# Patient Record
Sex: MALE | Race: WHITE | HISPANIC OR LATINO | Employment: OTHER | URBAN - METROPOLITAN AREA
[De-identification: names, ages, dates, MRNs, and addresses within clinical notes are randomized per-mention and may not be internally consistent; named-entity substitution may affect disease eponyms.]

---

## 2023-06-02 ENCOUNTER — APPOINTMENT (OUTPATIENT)
Dept: CT IMAGING | Facility: CLINIC | Age: 68
DRG: 517 | End: 2023-06-02
Attending: STUDENT IN AN ORGANIZED HEALTH CARE EDUCATION/TRAINING PROGRAM

## 2023-06-02 ENCOUNTER — MEDICAL CORRESPONDENCE (OUTPATIENT)
Dept: HEALTH INFORMATION MANAGEMENT | Facility: CLINIC | Age: 68
End: 2023-06-02

## 2023-06-02 ENCOUNTER — HOSPITAL ENCOUNTER (INPATIENT)
Facility: CLINIC | Age: 68
LOS: 1 days | Discharge: HOME | DRG: 517 | End: 2023-06-04
Attending: STUDENT IN AN ORGANIZED HEALTH CARE EDUCATION/TRAINING PROGRAM | Admitting: INTERNAL MEDICINE

## 2023-06-02 ENCOUNTER — TRANSFERRED RECORDS (OUTPATIENT)
Dept: HEALTH INFORMATION MANAGEMENT | Facility: CLINIC | Age: 68
End: 2023-06-02

## 2023-06-02 DIAGNOSIS — H49.22 LEFT ABDUCENS NERVE PALSY: ICD-10-CM

## 2023-06-02 DIAGNOSIS — M31.6 GIANT CELL ARTERITIS (H): Primary | ICD-10-CM

## 2023-06-02 DIAGNOSIS — H53.8 BLURRED VISION: ICD-10-CM

## 2023-06-02 DIAGNOSIS — Z11.52 ENCOUNTER FOR SCREENING LABORATORY TESTING FOR SEVERE ACUTE RESPIRATORY SYNDROME CORONAVIRUS 2 (SARS-COV-2): ICD-10-CM

## 2023-06-02 DIAGNOSIS — G44.209 TENSION HEADACHE: ICD-10-CM

## 2023-06-02 DIAGNOSIS — E11.9 TYPE 2 DIABETES MELLITUS WITHOUT COMPLICATION, WITHOUT LONG-TERM CURRENT USE OF INSULIN (H): ICD-10-CM

## 2023-06-02 LAB
ANION GAP SERPL CALCULATED.3IONS-SCNC: 12 MMOL/L (ref 7–15)
BASOPHILS # BLD AUTO: 0 10E3/UL (ref 0–0.2)
BASOPHILS NFR BLD AUTO: 0 %
BUN SERPL-MCNC: 13.8 MG/DL (ref 8–23)
CALCIUM SERPL-MCNC: 9.1 MG/DL (ref 8.8–10.2)
CHLORIDE SERPL-SCNC: 105 MMOL/L (ref 98–107)
CREAT SERPL-MCNC: 0.71 MG/DL (ref 0.67–1.17)
CRP SERPL-MCNC: 9.97 MG/L
DEPRECATED HCO3 PLAS-SCNC: 21 MMOL/L (ref 22–29)
EOSINOPHIL # BLD AUTO: 0.2 10E3/UL (ref 0–0.7)
EOSINOPHIL NFR BLD AUTO: 3 %
ERYTHROCYTE [DISTWIDTH] IN BLOOD BY AUTOMATED COUNT: 12.6 % (ref 10–15)
ERYTHROCYTE [SEDIMENTATION RATE] IN BLOOD BY WESTERGREN METHOD: 86 MM/HR (ref 0–20)
GFR SERPL CREATININE-BSD FRML MDRD: >90 ML/MIN/1.73M2
GLUCOSE SERPL-MCNC: 272 MG/DL (ref 70–99)
HCT VFR BLD AUTO: 33.9 % (ref 40–53)
HGB BLD-MCNC: 10.7 G/DL (ref 13.3–17.7)
IMM GRANULOCYTES # BLD: 0 10E3/UL
IMM GRANULOCYTES NFR BLD: 0 %
LYMPHOCYTES # BLD AUTO: 2 10E3/UL (ref 0.8–5.3)
LYMPHOCYTES NFR BLD AUTO: 24 %
MCH RBC QN AUTO: 25.3 PG (ref 26.5–33)
MCHC RBC AUTO-ENTMCNC: 31.6 G/DL (ref 31.5–36.5)
MCV RBC AUTO: 80 FL (ref 78–100)
MONOCYTES # BLD AUTO: 0.5 10E3/UL (ref 0–1.3)
MONOCYTES NFR BLD AUTO: 6 %
NEUTROPHILS # BLD AUTO: 5.7 10E3/UL (ref 1.6–8.3)
NEUTROPHILS NFR BLD AUTO: 67 %
NRBC # BLD AUTO: 0 10E3/UL
NRBC BLD AUTO-RTO: 0 /100
PLATELET # BLD AUTO: 362 10E3/UL (ref 150–450)
POTASSIUM SERPL-SCNC: 4.2 MMOL/L (ref 3.4–5.3)
RBC # BLD AUTO: 4.23 10E6/UL (ref 4.4–5.9)
RETINOPATHY: POSITIVE
SODIUM SERPL-SCNC: 138 MMOL/L (ref 136–145)
WBC # BLD AUTO: 8.5 10E3/UL (ref 4–11)

## 2023-06-02 PROCEDURE — 009U3ZX DRAINAGE OF SPINAL CANAL, PERCUTANEOUS APPROACH, DIAGNOSTIC: ICD-10-PCS | Performed by: STUDENT IN AN ORGANIZED HEALTH CARE EDUCATION/TRAINING PROGRAM

## 2023-06-02 PROCEDURE — 36415 COLL VENOUS BLD VENIPUNCTURE: CPT | Performed by: STUDENT IN AN ORGANIZED HEALTH CARE EDUCATION/TRAINING PROGRAM

## 2023-06-02 PROCEDURE — 99231 SBSQ HOSP IP/OBS SF/LOW 25: CPT | Performed by: OPHTHALMOLOGY

## 2023-06-02 PROCEDURE — 85025 COMPLETE CBC W/AUTO DIFF WBC: CPT | Performed by: STUDENT IN AN ORGANIZED HEALTH CARE EDUCATION/TRAINING PROGRAM

## 2023-06-02 PROCEDURE — 82945 GLUCOSE OTHER FLUID: CPT | Performed by: STUDENT IN AN ORGANIZED HEALTH CARE EDUCATION/TRAINING PROGRAM

## 2023-06-02 PROCEDURE — 70450 CT HEAD/BRAIN W/O DYE: CPT | Mod: 26 | Performed by: RADIOLOGY

## 2023-06-02 PROCEDURE — 80048 BASIC METABOLIC PNL TOTAL CA: CPT | Performed by: STUDENT IN AN ORGANIZED HEALTH CARE EDUCATION/TRAINING PROGRAM

## 2023-06-02 PROCEDURE — 03BT0ZX EXCISION OF LEFT TEMPORAL ARTERY, OPEN APPROACH, DIAGNOSTIC: ICD-10-PCS | Performed by: OPHTHALMOLOGY

## 2023-06-02 PROCEDURE — 89050 BODY FLUID CELL COUNT: CPT | Performed by: STUDENT IN AN ORGANIZED HEALTH CARE EDUCATION/TRAINING PROGRAM

## 2023-06-02 PROCEDURE — 62270 DX LMBR SPI PNXR: CPT | Performed by: STUDENT IN AN ORGANIZED HEALTH CARE EDUCATION/TRAINING PROGRAM

## 2023-06-02 PROCEDURE — 84157 ASSAY OF PROTEIN OTHER: CPT | Performed by: STUDENT IN AN ORGANIZED HEALTH CARE EDUCATION/TRAINING PROGRAM

## 2023-06-02 PROCEDURE — 85652 RBC SED RATE AUTOMATED: CPT | Performed by: STUDENT IN AN ORGANIZED HEALTH CARE EDUCATION/TRAINING PROGRAM

## 2023-06-02 PROCEDURE — 62270 DX LMBR SPI PNXR: CPT

## 2023-06-02 PROCEDURE — 83036 HEMOGLOBIN GLYCOSYLATED A1C: CPT

## 2023-06-02 PROCEDURE — 87205 SMEAR GRAM STAIN: CPT | Performed by: STUDENT IN AN ORGANIZED HEALTH CARE EDUCATION/TRAINING PROGRAM

## 2023-06-02 PROCEDURE — C9803 HOPD COVID-19 SPEC COLLECT: HCPCS

## 2023-06-02 PROCEDURE — 86140 C-REACTIVE PROTEIN: CPT | Performed by: STUDENT IN AN ORGANIZED HEALTH CARE EDUCATION/TRAINING PROGRAM

## 2023-06-02 PROCEDURE — 99285 EMERGENCY DEPT VISIT HI MDM: CPT | Mod: 25

## 2023-06-02 PROCEDURE — 87015 SPECIMEN INFECT AGNT CONCNTJ: CPT | Performed by: STUDENT IN AN ORGANIZED HEALTH CARE EDUCATION/TRAINING PROGRAM

## 2023-06-02 PROCEDURE — 99285 EMERGENCY DEPT VISIT HI MDM: CPT | Mod: 25 | Performed by: STUDENT IN AN ORGANIZED HEALTH CARE EDUCATION/TRAINING PROGRAM

## 2023-06-02 PROCEDURE — 70450 CT HEAD/BRAIN W/O DYE: CPT

## 2023-06-02 RX ORDER — PROPARACAINE HYDROCHLORIDE 5 MG/ML
1 SOLUTION/ DROPS OPHTHALMIC ONCE
Status: DISCONTINUED | OUTPATIENT
Start: 2023-06-02 | End: 2023-06-04 | Stop reason: HOSPADM

## 2023-06-02 ASSESSMENT — ACTIVITIES OF DAILY LIVING (ADL)
ADLS_ACUITY_SCORE: 35

## 2023-06-02 NOTE — ED TRIAGE NOTES
Pt presents with referral from opthalmology for concerns of increased interocular pressure to the left eye.       Triage Assessment     Row Name 06/02/23 1330       Triage Assessment (Adult)    Airway WDL WDL       Respiratory WDL    Respiratory WDL WDL       Skin Circulation/Temperature WDL    Skin Circulation/Temperature WDL WDL       Cardiac WDL    Cardiac WDL WDL       Peripheral/Neurovascular WDL    Peripheral Neurovascular WDL WDL       Cognitive/Neuro/Behavioral WDL    Cognitive/Neuro/Behavioral WDL WDL

## 2023-06-02 NOTE — ED PROVIDER NOTES
[place] EMERGENCY DEPARTMENT ([facility])    6/02/23       ED PROVIDER NOTE    History     Chief Complaint   Patient presents with     Eye Problem     HPI  Zeyad Machado is a 67 year old male who has a history of T2DM presents to the ED with concerns for his his ophthalmologist in the setting of his double vision and headaches.    Patient endorses double vision especially with looking left, and does have occasional headaches, at this time has mild headache on the left side of his head, no other significant symptoms.  No nausea, vomiting.    Per chart review, the patient has been seen at [facility] today (6/02/2023) with c/o of double vision and pain starting from left eye to back of head. . PT was referred from [facility].   Patient stated that this started about 2 months ago. He relieved headaches with tylenol, had diplopia at time of onset headaches. Headaches are left-sided starts near left eye and works backwards to occipital area.  Diplopia has not improved. Patient initially had a fairly broad work-up at [facility] with concern for possible underlying stroke, and ended up with a CT as well as MRI which ruled out stroke according to his daughter.  Per the records, it was noted that he had imaging but did not have a lumbar puncture done at that time.    At his ophthalmology appointment today, they noted that he had evidence of papilledema bilaterally, and were concerned and recommended that he be evaluated here with an LP done with opening pressure.  Please page ophthalmology/neuro-ophthalmology upon arrival. Pupils were dilated at exam today.      Reason for referral: Left CN 6th palsy with left-sided possible optic nerve edema with left-sided headache, had MRI at [facility]      Past Medical History  History reviewed. No pertinent past medical history.  History reviewed. No pertinent surgical history.  metFORMIN (GLUCOPHAGE) 500 MG tablet      No Known  "Allergies  Family History  History reviewed. No pertinent family history.  Social History   Social History     Tobacco Use     Smoking status: Every Day     Types: Cigarettes     Smokeless tobacco: Never         A medically appropriate review of systems was performed with pertinent positives and negatives noted in the HPI, and all other systems negative.    Physical Exam   BP: 123/62  Pulse: 100  Temp: 98.4  F (36.9  C)  Resp: 18  Height: 165.1 cm (5' 5\")  Weight: 68.9 kg (152 lb)  SpO2: 98 %  Physical Exam  Eyes:      Extraocular Movements:      Right eye: No nystagmus.      Left eye: Abnormal extraocular motion (No abduction) present. No nystagmus.       GEN: Well appearing, non toxic, cooperative  HEENT: normocephalic and atraumatic, PERRLA;   CV: well-perfused, normal skin color for ethnicity, regular rate and rhythm  PULM: breathing comfortably, in no respiratory distress, clear to auscultation  ABD: nondistended  EXT: Full range of motion.  No edema.  Neuro:  CN II-XII intact with the exception of his left eye abduction which is absent  Awake, alert, oriented x3  Motor: Grossly intact motor function of bilateral upper and lower extremities.  Relatively preserved muscle tone bilateral upper and lower extremities  Sensory: Grossly intact sensation in bilateral upper and lower  Coordination: No truncal or limb ataxia, normal finger-to-nose, normal rapid alternatin  SKIN: No rashes, ecchymosis, or lacerations  PSYCH: Calm and cooperative, interactive      ED Course, Procedures, & Data      [facility]    -Lumbar Puncture    Date/Time: 6/3/2023 12:10 AM    Performed by: Gloria Ordoñez MD  Authorized by: Gloria Ordoñez MD    Risks, benefits and alternatives discussed.      PRE-PROCEDURE DETAILS:     Procedure purpose:  Diagnostic    ANESTHESIA (see MAR for exact dosages):     Anesthesia method:  None  PROCEDURE DETAILS:     Lumbar space:  L4-L5 interspace    Patient " position:  L lateral decubitus    Needle gauge:  20    Needle type:  Spinal needle - Quincke tip    Needle length (in):  3.5    Ultrasound guidance: no      Number of attempts:  1    Opening pressure (cm H2O):  22    Closing pressure (cm H2O):  14    Fluid appearance:  Clear    Tubes of fluid:  4    Total volume (ml):  6    POST-PROCEDURE:     Puncture site:  Adhesive bandage applied        PROCEDURE    Patient Tolerance:  Patient tolerated the procedure well with no immediate complications                 Results for orders placed or performed during the hospital encounter of 06/02/23   CT Head w/o Contrast     Status: None    Narrative    CT HEAD W/O CONTRAST 6/2/2023 4:49 PM    Provided History: CN IV palsy, papilledema    Comparison: None.    Technique: Using multidetector thin collimation helical acquisition  technique, axial, coronal and sagittal CT images from the skull base  to the vertex were obtained without intravenous contrast.     Findings:    No intracranial hemorrhage. No mass effect. No midline shift. No  extra-axial fluid collection. The gray to white matter differentiation  of the cerebral hemispheres is preserved. Ventricles are proportionate  to the sulci. No sulcal effacement. The basal cisterns are patent.    The visualized paranasal sinuses are relatively clear. The mastoid air  cells are hypoplastic, but clear. Orbits appear unremarkable. No acute  fracture.      Impression    Impression: No acute intracranial pathology.    I have personally reviewed the examination and initial interpretation  and I agree with the findings.    ENZO JEFFERSON MD         SYSTEM ID:  [id number]   Basic metabolic panel     Status: Abnormal   Result Value Ref Range    Sodium 138 136 - 145 mmol/L    Potassium 4.2 3.4 - 5.3 mmol/L    Chloride 105 98 - 107 mmol/L    Carbon Dioxide (CO2) 21 (L) 22 - 29 mmol/L    Anion Gap 12 7 - 15 mmol/L    Urea Nitrogen 13.8 8.0 - 23.0 mg/dL    Creatinine 0.71 0.67 - 1.17 mg/dL     Calcium 9.1 8.8 - 10.2 mg/dL    Glucose 272 (H) 70 - 99 mg/dL    GFR Estimate >90 >60 mL/min/1.73m2   Erythrocyte sedimentation rate auto     Status: Abnormal   Result Value Ref Range    Erythrocyte Sedimentation Rate 86 (H) 0 - 20 mm/hr   CRP inflammation     Status: Abnormal   Result Value Ref Range    CRP Inflammation 9.97 (H) <5.00 mg/L   CBC with platelets and differential     Status: Abnormal   Result Value Ref Range    WBC Count 8.5 4.0 - 11.0 10e3/uL    RBC Count 4.23 (L) 4.40 - 5.90 10e6/uL    Hemoglobin 10.7 (L) 13.3 - 17.7 g/dL    Hematocrit 33.9 (L) 40.0 - 53.0 %    MCV 80 78 - 100 fL    MCH 25.3 (L) 26.5 - 33.0 pg    MCHC 31.6 31.5 - 36.5 g/dL    RDW 12.6 10.0 - 15.0 %    Platelet Count 362 150 - 450 10e3/uL    % Neutrophils 67 %    % Lymphocytes 24 %    % Monocytes 6 %    % Eosinophils 3 %    % Basophils 0 %    % Immature Granulocytes 0 %    NRBCs per 100 WBC 0 <1 /100    Absolute Neutrophils 5.7 1.6 - 8.3 10e3/uL    Absolute Lymphocytes 2.0 0.8 - 5.3 10e3/uL    Absolute Monocytes 0.5 0.0 - 1.3 10e3/uL    Absolute Eosinophils 0.2 0.0 - 0.7 10e3/uL    Absolute Basophils 0.0 0.0 - 0.2 10e3/uL    Absolute Immature Granulocytes 0.0 <=0.4 10e3/uL    Absolute NRBCs 0.0 10e3/uL   CBC with platelets differential     Status: Abnormal    Narrative    The following orders were created for panel order CBC with platelets differential.  Procedure                               Abnormality         Status                     ---------                               -----------         ------                     CBC with platelets and d...[[id number]]  Abnormal            Final result                 Please view results for these tests on the individual orders.   CSF Cell Count with Differential:     Status: None (In process)    Narrative    The following orders were created for panel order CSF Cell Count with Differential:.  Procedure                               Abnormality         Status                     ---------                                -----------         ------                     Cell Count CSF[[id number]]                                   In process                   Please view results for these tests on the individual orders.     Medications   proparacaine (ALCAINE) 0.5 % ophthalmic solution 1 drop (has no administration in time range)     Labs Ordered and Resulted from Time of ED Arrival to Time of ED Departure   BASIC METABOLIC PANEL - Abnormal       Result Value    Sodium 138      Potassium 4.2      Chloride 105      Carbon Dioxide (CO2) 21 (*)     Anion Gap 12      Urea Nitrogen 13.8      Creatinine 0.71      Calcium 9.1      Glucose 272 (*)     GFR Estimate >90     ERYTHROCYTE SEDIMENTATION RATE AUTO - Abnormal    Erythrocyte Sedimentation Rate 86 (*)    CRP INFLAMMATION - Abnormal    CRP Inflammation 9.97 (*)    CBC WITH PLATELETS AND DIFFERENTIAL - Abnormal    WBC Count 8.5      RBC Count 4.23 (*)     Hemoglobin 10.7 (*)     Hematocrit 33.9 (*)     MCV 80      MCH 25.3 (*)     MCHC 31.6      RDW 12.6      Platelet Count 362      % Neutrophils 67      % Lymphocytes 24      % Monocytes 6      % Eosinophils 3      % Basophils 0      % Immature Granulocytes 0      NRBCs per 100 WBC 0      Absolute Neutrophils 5.7      Absolute Lymphocytes 2.0      Absolute Monocytes 0.5      Absolute Eosinophils 0.2      Absolute Basophils 0.0      Absolute Immature Granulocytes 0.0      Absolute NRBCs 0.0     GLUCOSE CSF   PROTEIN TOTAL CSF   CELL COUNT CSF   AEROBIC BACTERIAL CULTURE ROUTINE   CELL COUNT WITH DIFFERENTIAL CSF     CT Head w/o Contrast   Final Result   Impression: No acute intracranial pathology.      I have personally reviewed the examination and initial interpretation   and I agree with the findings.      ENZO JEFFERSON MD            SYSTEM ID:  [id number]             Critical care was not performed.     Medical Decision Making  The patient's presentation was of moderate complexity (an undiagnosed new problem  with uncertain diagnosis).    The patient's evaluation involved:  review of external note(s) from 3+ sources (see separate area of note for details)  ordering and/or review of 3+ test(s) in this encounter (see separate area of note for details)  review of 3+ test result(s) ordered prior to this encounter (see separate area of note for details)    The patient's management necessitated high risk (a decision regarding hospitalization).      Assessment & Plan    67-year-old male with a history of diabetic retinopathy presenting to the emergency department in setting of approximately 3 months of double vision with left eye cranial nerve VI palsy noted, with previous CT, and MRI that was within normal limits according to family, with no records available at this time presenting from the ophthalmologist due to concern for papilledema noted bilaterally in the setting of his cranial 6th nerve palsy, with request for evaluation by neuro ophthalmology here, as well as further work-up including lab work and likely lumbar puncture to evaluate for opening pressure here.    Difficulty due to the fact that his records do not appear to be communicating with this chart.  Unable to find any further work-up that he had done previously, even though [facility] and should be in our system.  However, according to family the MRI he had done was not abnormal with no evidence of any lesions or tumors.  Less consistent with normal pressure hydrocephalus, however daily headaches especially worse in the morning and some visual changes could possibly represent this.    We will plan for repeat head CT due to due to neurodeficits, lab work here, and neuro-ophthalmology evaluation    12:14 AM LP performed.  No difficulty at the time, and clear CSF noted.  No significant elevated opening pressures.  Inflammatory markers are elevated and ophthalmology evaluated the patient with concern for possible edema of the optic disc.  In light of  elevated ESR and CRP, concern for possible temporal/giant cell arteritis.  We will plan for IV methylprednisone, and admission to medicine.  Neurology is aware and will follow    I have reviewed the nursing notes. I have reviewed the findings, diagnosis, plan and need for follow up with the patient.    New Prescriptions    No medications on file       Final diagnoses:   Tension headache   Blurred vision   Left abducens nerve palsy       Gloria Ordoñez MD  [facility] EMERGENCY DEPARTMENT  6/2/2023     Gloria Ordoñez MD  06/03/23 0015

## 2023-06-03 ENCOUNTER — APPOINTMENT (OUTPATIENT)
Dept: GENERAL RADIOLOGY | Facility: CLINIC | Age: 68
DRG: 517 | End: 2023-06-03
Attending: STUDENT IN AN ORGANIZED HEALTH CARE EDUCATION/TRAINING PROGRAM

## 2023-06-03 PROBLEM — M31.6 GIANT CELL ARTERITIS (H): Status: ACTIVE | Noted: 2023-06-03

## 2023-06-03 LAB
ALBUMIN MFR UR ELPH: 59.6 MG/DL (ref 1–14)
ALBUMIN SERPL BCG-MCNC: 3.8 G/DL (ref 3.5–5.2)
ALBUMIN UR-MCNC: 50 MG/DL
ALP SERPL-CCNC: 106 U/L (ref 40–129)
ALT SERPL W P-5'-P-CCNC: 21 U/L (ref 10–50)
ANION GAP SERPL CALCULATED.3IONS-SCNC: 14 MMOL/L (ref 7–15)
APPEARANCE CSF: CLEAR
APPEARANCE UR: CLEAR
AST SERPL W P-5'-P-CCNC: 20 U/L (ref 10–50)
BILIRUB SERPL-MCNC: 0.2 MG/DL
BILIRUB UR QL STRIP: NEGATIVE
BUN SERPL-MCNC: 14.1 MG/DL (ref 8–23)
CALCIUM SERPL-MCNC: 9.2 MG/DL (ref 8.8–10.2)
CHLORIDE SERPL-SCNC: 105 MMOL/L (ref 98–107)
CHOLEST SERPL-MCNC: 133 MG/DL
COLOR CSF: COLORLESS
COLOR UR AUTO: ABNORMAL
CREAT SERPL-MCNC: 0.76 MG/DL (ref 0.67–1.17)
CREAT UR-MCNC: 49.5 MG/DL
CRP SERPL-MCNC: 7.19 MG/L
DEPRECATED HCO3 PLAS-SCNC: 19 MMOL/L (ref 22–29)
ERYTHROCYTE [SEDIMENTATION RATE] IN BLOOD BY WESTERGREN METHOD: 78 MM/HR (ref 0–20)
GFR SERPL CREATININE-BSD FRML MDRD: >90 ML/MIN/1.73M2
GLUCOSE BLDC GLUCOMTR-MCNC: 279 MG/DL (ref 70–99)
GLUCOSE BLDC GLUCOMTR-MCNC: 281 MG/DL (ref 70–99)
GLUCOSE BLDC GLUCOMTR-MCNC: 294 MG/DL (ref 70–99)
GLUCOSE BLDC GLUCOMTR-MCNC: 406 MG/DL (ref 70–99)
GLUCOSE CSF-MCNC: 116 MG/DL (ref 40–70)
GLUCOSE SERPL-MCNC: 301 MG/DL (ref 70–99)
GLUCOSE UR STRIP-MCNC: >=1000 MG/DL
HBA1C MFR BLD: 8.1 %
HDLC SERPL-MCNC: 31 MG/DL
HGB UR QL STRIP: NEGATIVE
HOLD SPECIMEN: NORMAL
HOLD SPECIMEN: NORMAL
KETONES UR STRIP-MCNC: 20 MG/DL
LDLC SERPL CALC-MCNC: 84 MG/DL
LEUKOCYTE ESTERASE UR QL STRIP: NEGATIVE
NITRATE UR QL: NEGATIVE
NONHDLC SERPL-MCNC: 102 MG/DL
PH UR STRIP: 6 [PH] (ref 5–7)
POTASSIUM SERPL-SCNC: 4.1 MMOL/L (ref 3.4–5.3)
PROT CSF-MCNC: 61.9 MG/DL (ref 15–45)
PROT SERPL-MCNC: 7.2 G/DL (ref 6.4–8.3)
PROT/CREAT 24H UR: 1.2 MG/MG CR (ref 0–0.2)
RBC # CSF MANUAL: 0 /UL (ref 0–2)
RBC URINE: 2 /HPF
SARS-COV-2 RNA RESP QL NAA+PROBE: NEGATIVE
SODIUM SERPL-SCNC: 138 MMOL/L (ref 136–145)
SP GR UR STRIP: 1.02 (ref 1–1.03)
TRANSITIONAL EPI: <1 /HPF
TRIGL SERPL-MCNC: 92 MG/DL
TUBE # CSF: 4
UROBILINOGEN UR STRIP-MCNC: NORMAL MG/DL
WBC # CSF MANUAL: 0 /UL (ref 0–5)
WBC URINE: 1 /HPF

## 2023-06-03 PROCEDURE — 250N000012 HC RX MED GY IP 250 OP 636 PS 637

## 2023-06-03 PROCEDURE — 250N000009 HC RX 250: Performed by: STUDENT IN AN ORGANIZED HEALTH CARE EDUCATION/TRAINING PROGRAM

## 2023-06-03 PROCEDURE — 99222 1ST HOSP IP/OBS MODERATE 55: CPT | Mod: GC | Performed by: PSYCHIATRY & NEUROLOGY

## 2023-06-03 PROCEDURE — 86481 TB AG RESPONSE T-CELL SUSP: CPT | Performed by: STUDENT IN AN ORGANIZED HEALTH CARE EDUCATION/TRAINING PROGRAM

## 2023-06-03 PROCEDURE — 36415 COLL VENOUS BLD VENIPUNCTURE: CPT

## 2023-06-03 PROCEDURE — 87476 LYME DIS DNA AMP PROBE: CPT | Performed by: STUDENT IN AN ORGANIZED HEALTH CARE EDUCATION/TRAINING PROGRAM

## 2023-06-03 PROCEDURE — 87635 SARS-COV-2 COVID-19 AMP PRB: CPT

## 2023-06-03 PROCEDURE — 82962 GLUCOSE BLOOD TEST: CPT

## 2023-06-03 PROCEDURE — 86037 ANCA TITER EACH ANTIBODY: CPT | Performed by: STUDENT IN AN ORGANIZED HEALTH CARE EDUCATION/TRAINING PROGRAM

## 2023-06-03 PROCEDURE — 80053 COMPREHEN METABOLIC PANEL: CPT

## 2023-06-03 PROCEDURE — 80061 LIPID PANEL: CPT

## 2023-06-03 PROCEDURE — 86140 C-REACTIVE PROTEIN: CPT

## 2023-06-03 PROCEDURE — 86036 ANCA SCREEN EACH ANTIBODY: CPT | Performed by: STUDENT IN AN ORGANIZED HEALTH CARE EDUCATION/TRAINING PROGRAM

## 2023-06-03 PROCEDURE — 71045 X-RAY EXAM CHEST 1 VIEW: CPT

## 2023-06-03 PROCEDURE — 81001 URINALYSIS AUTO W/SCOPE: CPT

## 2023-06-03 PROCEDURE — 85652 RBC SED RATE AUTOMATED: CPT

## 2023-06-03 PROCEDURE — 99222 1ST HOSP IP/OBS MODERATE 55: CPT | Mod: GC | Performed by: INTERNAL MEDICINE

## 2023-06-03 PROCEDURE — 250N000011 HC RX IP 250 OP 636: Performed by: STUDENT IN AN ORGANIZED HEALTH CARE EDUCATION/TRAINING PROGRAM

## 2023-06-03 PROCEDURE — 250N000013 HC RX MED GY IP 250 OP 250 PS 637

## 2023-06-03 PROCEDURE — 88305 TISSUE EXAM BY PATHOLOGIST: CPT | Mod: 26 | Performed by: STUDENT IN AN ORGANIZED HEALTH CARE EDUCATION/TRAINING PROGRAM

## 2023-06-03 PROCEDURE — 86780 TREPONEMA PALLIDUM: CPT | Performed by: STUDENT IN AN ORGANIZED HEALTH CARE EDUCATION/TRAINING PROGRAM

## 2023-06-03 PROCEDURE — 120N000002 HC R&B MED SURG/OB UMMC

## 2023-06-03 PROCEDURE — 258N000003 HC RX IP 258 OP 636: Performed by: STUDENT IN AN ORGANIZED HEALTH CARE EDUCATION/TRAINING PROGRAM

## 2023-06-03 PROCEDURE — 88305 TISSUE EXAM BY PATHOLOGIST: CPT | Mod: TC | Performed by: STUDENT IN AN ORGANIZED HEALTH CARE EDUCATION/TRAINING PROGRAM

## 2023-06-03 PROCEDURE — 71045 X-RAY EXAM CHEST 1 VIEW: CPT | Mod: 26 | Performed by: RADIOLOGY

## 2023-06-03 PROCEDURE — 36415 COLL VENOUS BLD VENIPUNCTURE: CPT | Performed by: STUDENT IN AN ORGANIZED HEALTH CARE EDUCATION/TRAINING PROGRAM

## 2023-06-03 PROCEDURE — 88313 SPECIAL STAINS GROUP 2: CPT | Mod: 26 | Performed by: STUDENT IN AN ORGANIZED HEALTH CARE EDUCATION/TRAINING PROGRAM

## 2023-06-03 PROCEDURE — 82164 ANGIOTENSIN I ENZYME TEST: CPT | Performed by: STUDENT IN AN ORGANIZED HEALTH CARE EDUCATION/TRAINING PROGRAM

## 2023-06-03 PROCEDURE — 84156 ASSAY OF PROTEIN URINE: CPT

## 2023-06-03 RX ORDER — NICOTINE POLACRILEX 4 MG
15-30 LOZENGE BUCCAL
Status: DISCONTINUED | OUTPATIENT
Start: 2023-06-03 | End: 2023-06-04 | Stop reason: HOSPADM

## 2023-06-03 RX ORDER — LOSARTAN POTASSIUM 25 MG/1
25 TABLET ORAL DAILY
Status: DISCONTINUED | OUTPATIENT
Start: 2023-06-03 | End: 2023-06-04 | Stop reason: HOSPADM

## 2023-06-03 RX ORDER — ATORVASTATIN CALCIUM 40 MG/1
40 TABLET, FILM COATED ORAL AT BEDTIME
Status: DISCONTINUED | OUTPATIENT
Start: 2023-06-03 | End: 2023-06-04 | Stop reason: HOSPADM

## 2023-06-03 RX ORDER — METHYLPREDNISOLONE SODIUM SUCCINATE 125 MG/2ML
1000 INJECTION, POWDER, LYOPHILIZED, FOR SOLUTION INTRAMUSCULAR; INTRAVENOUS DAILY
Status: DISCONTINUED | OUTPATIENT
Start: 2023-06-03 | End: 2023-06-03

## 2023-06-03 RX ORDER — ASPIRIN 81 MG/1
81 TABLET, CHEWABLE ORAL DAILY
Status: DISCONTINUED | OUTPATIENT
Start: 2023-06-03 | End: 2023-06-04 | Stop reason: HOSPADM

## 2023-06-03 RX ORDER — DEXTROSE MONOHYDRATE 25 G/50ML
25-50 INJECTION, SOLUTION INTRAVENOUS
Status: DISCONTINUED | OUTPATIENT
Start: 2023-06-03 | End: 2023-06-04 | Stop reason: HOSPADM

## 2023-06-03 RX ORDER — SENNOSIDES 8.6 MG/1
TABLET, COATED ORAL
COMMUNITY
Start: 2023-05-19

## 2023-06-03 RX ORDER — LIDOCAINE HYDROCHLORIDE AND EPINEPHRINE 10; 10 MG/ML; UG/ML
5 INJECTION, SOLUTION INFILTRATION; PERINEURAL ONCE
Status: COMPLETED | OUTPATIENT
Start: 2023-06-03 | End: 2023-06-03

## 2023-06-03 RX ORDER — ACETAMINOPHEN 325 MG/1
650 TABLET ORAL EVERY 4 HOURS PRN
Status: DISCONTINUED | OUTPATIENT
Start: 2023-06-03 | End: 2023-06-04 | Stop reason: HOSPADM

## 2023-06-03 RX ORDER — ATORVASTATIN CALCIUM 40 MG/1
TABLET, FILM COATED ORAL
COMMUNITY
Start: 2023-02-20

## 2023-06-03 RX ORDER — METFORMIN HCL 500 MG
TABLET, EXTENDED RELEASE 24 HR ORAL
Status: ON HOLD | COMMUNITY
Start: 2023-02-20 | End: 2023-06-04

## 2023-06-03 RX ORDER — LIDOCAINE 40 MG/G
CREAM TOPICAL
Status: DISCONTINUED | OUTPATIENT
Start: 2023-06-03 | End: 2023-06-04 | Stop reason: HOSPADM

## 2023-06-03 RX ORDER — LOSARTAN POTASSIUM 25 MG/1
TABLET ORAL
COMMUNITY
Start: 2023-02-20

## 2023-06-03 RX ADMIN — METFORMIN HYDROCHLORIDE 1000 MG: 500 TABLET, FILM COATED ORAL at 17:37

## 2023-06-03 RX ADMIN — LIDOCAINE HYDROCHLORIDE,EPINEPHRINE BITARTRATE 5 ML: 10; .01 INJECTION, SOLUTION INFILTRATION; PERINEURAL at 11:17

## 2023-06-03 RX ADMIN — ASPIRIN 81 MG CHEWABLE TABLET 81 MG: 81 TABLET CHEWABLE at 12:42

## 2023-06-03 RX ADMIN — INSULIN ASPART 3 UNITS: 100 INJECTION, SOLUTION INTRAVENOUS; SUBCUTANEOUS at 09:09

## 2023-06-03 RX ADMIN — ATORVASTATIN CALCIUM 40 MG: 40 TABLET, FILM COATED ORAL at 22:14

## 2023-06-03 RX ADMIN — SODIUM CHLORIDE 1000 MG: 9 INJECTION, SOLUTION INTRAVENOUS at 02:15

## 2023-06-03 RX ADMIN — LOSARTAN POTASSIUM 25 MG: 25 TABLET, FILM COATED ORAL at 12:42

## 2023-06-03 RX ADMIN — INSULIN ASPART 6 UNITS: 100 INJECTION, SOLUTION INTRAVENOUS; SUBCUTANEOUS at 18:14

## 2023-06-03 RX ADMIN — ACETAMINOPHEN 650 MG: 325 TABLET ORAL at 20:56

## 2023-06-03 RX ADMIN — INSULIN ASPART 3 UNITS: 100 INJECTION, SOLUTION INTRAVENOUS; SUBCUTANEOUS at 12:53

## 2023-06-03 ASSESSMENT — ACTIVITIES OF DAILY LIVING (ADL)
ADLS_ACUITY_SCORE: 35
ADLS_ACUITY_SCORE: 36
ADLS_ACUITY_SCORE: 35

## 2023-06-03 ASSESSMENT — VISUAL ACUITY: OU: DOUBLE VISION/DIPLOPIA

## 2023-06-03 NOTE — MEDICATION SCRIBE - ADMISSION MEDICATION HISTORY
Medication Scribe Admission Medication History    Admission medication history is complete. The information provided in this note is only as accurate as the sources available at the time of the update.    Medication reconciliation/reorder completed by provider prior to medication history? No    Information Source(s): Patient via in-person    Pertinent Information: None    Changes made to PTA medication list:    Added: None    Deleted: None    Changed: None    Medication Affordability:   No    Allergies reviewed with patient and updates made in EHR: no    Medication History Completed By: RT Gopal 6/3/2023 9:05 AM    Prior to Admission medications    Medication Sig Last Dose Taking? Auth Provider Long Term End Date   atorvastatin (LIPITOR) 40 MG tablet TOME CÉSAR TABLETA POR LA BOCA CADA NOCHE Past Week Yes Reported, Patient Yes    losartan (COZAAR) 25 MG tablet TOME CÉSAR TABLETA POR LA BOCA CADA NAREN Past Week Yes Reported, Patient Yes    metFORMIN (GLUCOPHAGE XR) 500 MG 24 hr tablet TOME CUATRO TABLETAS POR LA BOCA CADA NAREN CON COMIDA Past Week Yes Reported, Patient Yes    SENNA-TIME 8.6 MG tablet TAKE ONE TABLET BY MOUTH AS DIRECTED 1-2 veces EVERY DAY AS NEEDED para estrenimiento  at prn Yes Reported, Patient

## 2023-06-03 NOTE — DISCHARGE INSTRUCTIONS
Temporal Artery Biopsy Home Care Instructions    Bleeding or bruising: Bruising is common. Putting ice on the area for 15 minutes  at a time several times today can help reduce bruising. Pink or reddish fluid leaking  from the incision is common. If the incision should bleed, put direct pressure on it  for 10 minutes. If it is still bleeding, please call the office.    Bandage: If you have a bandage on the skin, this can be removed the morning after the procedure.     Ointment: Please apply bacitracin ointment to the wound twice a day for 3-5 days.     Activity: You may shower the morning after the biopsy. No heavy lifting or bending  below your waist on the day of the biopsy. No swimming or full immersion of your  head for a week.    Discomfort: You may take Tylenol (acetaminophen) for pain.    Stitches: Be gentle with the wound while stitches are in place. These will dissolve on their own over 2 weeks and do not need to be removed.    Results: Results may take up to 2 business days. Someone from the department will  call you and the doctor who ordered the biopsy. If you have not heard your results  within 2 days, please call the office.    Call your doctor if:  You have bleeding lasting more than 10 minutes.  You have pain that cannot be controlled.  You have signs of infection (fever, redness, swelling, drainage, or other signs)  You have not received your results within 2 days.      Please call if you have questions or concerns about your biopsy.  The office number is [phone] or [phone].

## 2023-06-03 NOTE — PROGRESS NOTES
Vascular Staff    Please note Vascular Surgery no longer able to provide temporal artery biopsy service. Please consult Ophthalmology.    Danay Thomas MD

## 2023-06-03 NOTE — H&P
"    [facility]    History and Physical - Medicine Service, [facility] TEAM        Date of Admission:  6/2/2023    Assessment & Plan      Zeyad Machado is a 67 year old male admitted on 6/2/2023. He has a history of diabetes and is admitted for double vision and headache. There was intially concern for Papilledema but that was not re demonstrated on our [facility] teams exam. In addition LP did not show elevated ICP. The combination of headache and left temporal tenderness, diplopia, and elevated CRP are concerning for Giant Cell Arteritis.  Appreciate optho and neuro recs. Low concern for infection at this time.     C/F [facility]  Optho, neuro and Vascular consult  Plan for Temporal Artery Biopsy  1g IVMP for 3 days   Follow up on LP cultures  Follow Up on A1C and UA per Neuro  Trend CRP    MRA at [facility] showed flow gap in M2 segment of right MCA, discuss with  Neurology        Diet: NPO per Anesthesia Guidelines for Procedure/Surgery Except for: Meds    DVT Prophylaxis: Pneumatic Compression Devices  Aldridge Catheter: Not present  Fluids: None  Lines: None     Cardiac Monitoring: None  Code Status: Full Code      Clinically Significant Risk Factors Present on Admission                       # Overweight: Estimated body mass index is 25.29 kg/m  as calculated from the following:    Height as of this encounter: 1.651 m (5' 5\").    Weight as of this encounter: 68.9 kg (152 lb).            Disposition Plan      Expected Discharge Date: 06/07/2023                The patient's care was discussed with the Attending Physician, Dr. Antony.      Kamlesh Killian MD  Medicine Service, [facility] TEAM   [facility]  Securely message with [employer] (more info)  Text page via [employer] Paging/Directory   See signed in provider for up to date coverage information  ______________________________________________________________________    Chief Complaint   Double Vision "     History of Present Illness   Zeyad Machado is a 67 year old male who presented to the ER from the optometry clinic after three months of blurry vision and left sided headache. He was seen in the [facility] ER for this in 4/27 where a CTA and MRI were performed and reportedly unremarkable. His symptoms have persisted and he was seen at the [facility] optho clinic on 6/2 where there was concern for papilaedema and he came to the ER.     In the ER optho was consulted and their exam did not show disc edema but they were concerned for giant cell arteritis. Neurology was also consulted and LP was performed with only mildly elevated opening pressure.    He was started on Methylprednisolone and admitted to medicine.     He denies all symptoms other than those above.       Past Medical History    History reviewed. No pertinent past medical history. T2DM on metformin    Past Surgical History   History reviewed. No pertinent surgical history.    Prior to Admission Medications   Prior to Admission Medications   Prescriptions Last Dose Informant Patient Reported? Taking?   metFORMIN (GLUCOPHAGE) 500 MG tablet   Yes Yes   Sig: Take 500 mg by mouth 2 times daily (with meals)      Facility-Administered Medications: None           Physical Exam   Vital Signs: Temp: 98.4  F (36.9  C) Temp src: Oral BP: (!) 142/68 Pulse: 86   Resp: 18 SpO2: 98 % O2 Device: None (Room air)    Weight: 152 lbs 0 oz    Physical Exam  Constitutional:       Appearance: Normal appearance.   HENT:      Head: Normocephalic and atraumatic.      Comments: Tenderness of left temple     Nose: Nose normal.   Eyes:      Comments: dysconjugate gaze, L eye unable to abduct, has both horizontal and vertical diplopia when he opens both eyes - disappears if he only opens one eye.    Cardiovascular:      Rate and Rhythm: Normal rate and regular rhythm.      Heart sounds: Normal heart sounds.   Abdominal:      Palpations: Abdomen is soft.   Skin:     General: Skin is warm.    Neurological:      Mental Status: He is alert.      Cranial Nerves: Cranial nerve deficit present.      Motor: No weakness.      Comments:  face symmetric, no dysarthria, diplopia as above   Psychiatric:         Mood and Affect: Mood normal.         Behavior: Behavior normal.         Thought Content: Thought content normal.         Judgment: Judgment normal.           Medical Decision Making             Data   CRP 9.97     LP with Elevated PTN

## 2023-06-03 NOTE — PROCEDURES
OPHTHALMOLOGY PROCEDURE NOTE - TEMPORAL ARTERY BIOPSY  Lala 3, 2023  Risk/Benefits/Alternatives/Complications: Discussed with patient via   Procedure:  Left temporal artery biopsy  Rationale: Headache, temporal tenderness, L CN 6 palsy, elevated ESR/CRP  Surgeon:  Teofilo Dial MD PhD  Assistant(s): Lashell Glover MD  Anesthesia:   Local  Complications: None  Disposition: Procedure competed in ED with patient remaining in stable condition                        Procedure Note:  After informed consent was obtained, the left superficial temporal artery was identified by palpation and the skin was marked. 4 cc of 1% Lidocaine with epinephrine (1:100,000) was given. The patient was prepped and draped in a sterile fashion. A #15 blade was used to incise the skin and subcutaneous fat. Blunt dissection was then used to identify and isolate the temporal artery from the surrounding fascia. The two ends of the temporal artery and one branch were tied off with 4-0 silk. The artery was then cut leaving a 1.0 mm stump at all ligatures. The specimen was placed in formalin. The remaining bed was observed for any bleeding and none was found. Three interrupted 6-0 Vicryl sutures were placed for deep closure and a running 6-0 Vicryl suture was used to close the skin. The wound was cleaned with sterile water and bacitracin ointment was spread on the wound. Two eye pads were taped in place over the site. Aftercare was discussed with patient and family. Discharge instructions updated in the chart. The specimen was delivered to pathology by Dr. Glover and accepted for processing.    Teofilo Dial MD PhD  Fellow, Neuro-Ophthalmology   Instructor, Comprehensive Ophthalmology

## 2023-06-03 NOTE — CONSULTS
OPHTHALMOLOGY CONSULT NOTE  06/02/23    Patient: Zeyad Machado      ASSESSMENT/PLAN:     Zeyad Machado is a 67 year old male who presented with     # Binocular double vision   # CN6 Palsy left eye   # Elevated inflammatory markers  - This is a 67 year old male who is admitted to the ED after referral from an outside ophthalmologist with concern for disc edema in the setting of two months of binocular double vision.   - VA 20/30 OU   - IOP wnl  - No disc edema appreciated on dilated fundus exam  - Vascular risk factors: HTN, diabetes  - GCA ROS    - positive - temporal tenderness, difficulty ambulating from seated position   - negative - no jaw claudication, myalgias, unintentional weight loss, or fever.    - palpation of the temporal artery reveals tenderness, but no  nodularity or prominence.   - ESR/CRP - both elevated  - MRI reads from April show no intracranial pathology that would explain his symptoms. Currently awaiting the imaging.   - This patient has clear vascular risk factors that may explain the development of his CN6 palsy. However, his inflammatory markers are elevated, and he does have symptoms concerning for GCA. This includes temporal tenderness and difficulty standing from a seated position.     Plan:  - Consult neurology  - Recommend 1g IVMP for 3 days   - Consult vascular surgery for TAB   - Labs: DALLAS, ANCA, Lyme, TB, Syphilis, Sarcoid (CXR) - ordered for you  - Ophthalmology will continue to follow     It is our pleasure to participate in this patient's care and treatment. Please contact us with any further questions or concerns.    Discussed and seen with Dr. Glover who agreed with this assessment and plan.     Ian Jj MD     HISTORY OF PRESENTING ILLNESS:     Zeyad Machado is a 67 year old male who presented on 6/2/23 with complaints of double vision. Three months ago the patient began to experience left sided headache and double vision.  He went to [facility] where he was  apparently evaluated with  MRI and CT which were clear. We do not have any records of this encounter. The patient was seen by [facility] who noted ?disc edema and sent to the [facility] for evaluation.     Today the patient continues to endorse double vision worse in left gaze that has not changed in quality since onset. No flashes or floaters. No pain, redness, tearing, or itching.     He feels as though his headaches have improved, and are well controlled with tylenol.      10+ review of systems were otherwise negative except for that which has been stated above.      OCULAR/MEDICAL/SURGICAL HISTORIES:     Past Ocular History:   None    Eye Drops:   None    Pertinent Systemic Medications:   None    Past Medical History:  History reviewed. No pertinent past medical history.    Past Surgical History:   History reviewed. No pertinent surgical history.    Family History:  History reviewed. No pertinent family history.      Social History:  Social History     Socioeconomic History     Marital status: Single     Spouse name: Not on file     Number of children: Not on file     Years of education: Not on file     Highest education level: Not on file   Occupational History     Not on file   Tobacco Use     Smoking status: Every Day     Types: Cigarettes     Smokeless tobacco: Never   Vaping Use     Vaping status: Not on file   Substance and Sexual Activity     Alcohol use: Not on file     Drug use: Not on file     Sexual activity: Not on file   Other Topics Concern     Not on file   Social History Narrative     Not on file     Social Determinants of Health     Financial Resource Strain: Not on file   Food Insecurity: Not on file   Transportation Needs: Not on file   Physical Activity: Not on file   Stress: Not on file   Social Connections: Not on file   Intimate Partner Violence: Not on file   Housing Stability: Not on file         EXAMINATION:     Base Eye Exam     Visual Acuity (Snellen - Linear)        Right Left    Near cc 20/30 20/30          Tonometry (Tonopen, 10:18 PM)       Right Left    Pressure 19 17          Pupils       Pupils    Right PERRL    Left PERRL          Visual Fields       Left Right     Full Full          Extraocular Movement       Right Left     0 0 0   0  0   0 0 0    0 0 -4   0  -4   0 0 -4             Dilation     Both eyes: 1.0% Mydriacyl, 2.5% Eldon Synephrine @ 10:18 PM            Additional Tests     Color       Right Left    Ishihara 11/11 11/11            Slit Lamp and Fundus Exam     External Exam       Right Left    External Normal Normal          Slit Lamp Exam       Right Left    Lids/Lashes Normal Normal    Conjunctiva/Sclera White and quiet White and quiet    Cornea Clear Clear    Anterior Chamber Deep and quiet Deep and quiet    Iris Round and reactive Round and reactive    Lens Clear Clear    Anterior Vitreous Normal Normal          Fundus Exam       Right Left    Disc Normal Normal    C/D Ratio 0.5 0.4    Macula dbh dbh, cws superiorly    Vessels Normal Normal    Periphery Normal Normal                Labs/Studies/Imaging Performed  LP - pending.. OP was 22  MRI from [facility] in April - images pending       Ian Jj M.D.  PGY-2 Resident Physician   Department of Ophthalmology  06/02/23 9:30 PM   Pager: [phone]

## 2023-06-03 NOTE — PROGRESS NOTES
[facility]    Progress Note - Medicine Service, MAROON TEAM 2       Date of Admission:  6/2/2023    Assessment & Plan   Zeyad Machado is a 67 year old male admitted on 6/2/2023. His past medical hx is significant for DM2, and HTN who is managed for sub acute L abducens palsy and concern for giant cell arteritis.    Today  - High dose steroids: switched from IV to PO  - Temp artery biopsy today by neuro-opthal  - Ophthalmology and neuro closely following  - Metformin increased from 500 mg every day to 1g BID dosing given hx of poor DM control and current steroid    #L temporal tenderness  #Concerns for giant cell arteritis  Pt with recent exam suggestive of temporal tenderness without obvious myalgia, vision loss, jaw claudication, or rheumatologic hx.   - High dose steroids: IV methyl pred (6/3); PO prednisone 80 mg (6/4--)  - ophthalmology consulted   - pt tp follow up with them closely at discharge   - format discharge recs pending     #L abducent palsy, subacute  (3 months)  Neuro was consulted,;gven stability in the past 3 months this is likely secondary to diabetes. Neuro suggested following up with ophthal as outpatient.  - See diabetes, HLD, HTN management as below    #DM2, A1c 8.1 at admission  Pt with hx of DM2. Not well controlled currently.   - Metformin increased from 500mg every day to 1g BID dosing given hx of poor DM control and current steroid  - will consider additional agents before discharge vs. With PCP (pt says PCP who can see him in clinic next week)  - MDSS  -Hypoglycemia protocol    #HLD  - Continue pto atorvastatin  - Lipid panel    #HTN  Blood pressure stable during this stay.  - continue pto losartan    Diet: Moderate Consistent Carb (60 g CHO per Meal) Diet    DVT Prophylaxis: Low Risk/Ambulatory with no VTE prophylaxis indicated and Pneumatic Compression Devices  Aldridge Catheter: Not present  Fluids: None  Lines: None     Cardiac Monitoring:  "None  Code Status: Full Code      Clinically Significant Risk Factors Present on Admission                  # Hypertension: Home medication list includes antihypertensive(s)     # DMII: A1C = 8.1 % (Ref range: <5.7 %) within past 6 months    # Overweight: Estimated body mass index is 25.29 kg/m  as calculated from the following:    Height as of this encounter: 1.651 m (5' 5\").    Weight as of this encounter: 68.9 kg (152 lb).            Disposition Plan      Expected Discharge Date: 06/07/2023                The patient's care was discussed with the Attending Physician, Dr. Rosa MD.    MARK WILLETT MD  Medicine Service, [address]  Securely message with [employer] (more info)  Text page via [facility] Paging/Directory   See signed in provider for up to date coverage information  ______________________________________________________________________    Interval History   NAEO. Remains stable. No new FND,or vision changes.     Physical Exam   Vital Signs: Temp: 98.3  F (36.8  C) Temp src: Oral BP: 124/70 Pulse: 97   Resp: 18 SpO2: 98 % O2 Device: None (Room air)    Weight: 152 lbs 0 oz    General: Pt NAD; had left temporal bandage in place and L eye partially squinted  HEENT: sclera clear, anicteric;  Neck: Supple   Card: RRR, normal S1 and S2, no murmurs  Abdomen:  soft and nontender, nondistended, no organomegaly, BS+  Neuro: Alert and oriented X 3; Left eye squinted, cannot abduct. Muscle strength 5/5 and intact sensory - upper and lower extremities;   Skin: No rashes, skin warm and dry, no erythematous areas  Extremities: No edema        Medical Decision Making       Please see A&P for additional details of medical decision making.      Data     I have personally reviewed the following data over the past 24 hrs:    N/A  \   N/A   / N/A     138 105 14.1 /  279 (H)   4.1 19 (L) 0.76 \       ALT: 21 AST: 20 AP: 106 TBILI: 0.2   ALB: 3.8 TOT " PROTEIN: 7.2 LIPASE: N/A       TSH: N/A T4: N/A A1C: N/A       Procal: N/A CRP: 7.19 (H) Lactic Acid: N/A         Imaging results reviewed over the past 24 hrs:   Recent Results (from the past 24 hour(s))   XR Chest Port 1 View    Narrative    Exam: XR CHEST PORT 1 VIEW, 6/3/2023 1:19 PM    Indication: r/o sarcoid    Comparison: None available    Findings:   Portable 65 degree upright AP view of the chest. The cardiomediastinal  silhouette and pulmonary vasculature are within normal limits. Poor  inspiration. No appreciable pleural effusion or pneumothorax. No focal  airspace opacity.      Impression    Impression: No acute airspace disease.    RISSA SHINE DO         SYSTEM ID:  [id number]

## 2023-06-03 NOTE — PLAN OF CARE
"/70 (BP Location: Right arm)   Pulse 97   Temp 98.3  F (36.8  C) (Oral)   Resp 18   Ht 1.651 m (5' 5\")   Wt 68.9 kg (152 lb)   SpO2 98%   BMI 25.29 kg/m      Patient is alert and oriented x 4. Speaks some english. L temporal dressing in place. Double vision, unchanged per patient. L PIV SL. Up independent in the room. BG this evening 406, sliding scale administered and provider notified and aware. Transferring to , transport ordered.  "

## 2023-06-03 NOTE — PLAN OF CARE
Goal Outcome Evaluation:    Temp: 98.3  F (36.8  C) Temp src: Oral BP: 124/70 Pulse: 97   Resp: 18 SpO2: 98 % O2 Device: None (Room air)      A& Ox4. Persian speaking. VSS. RA. Had Left temporal artery biopsy done at bedside today. Left temporal dressing c/d/I. Denies pain. Reports double vision. Denies headache pain. , 279- covered with insulin sliding scale. Voiding in bedside urinal. Up with SBA. L PIV SL

## 2023-06-03 NOTE — CONSULTS
[facility]  Neurology Consultation    Patient Name:  Zeyad Machado  MRN:  [id number]    :  1955  Date of Service:  Lala 3, 2023  Primary care provider:  Madeline Motley [facility]      Neurology consultation service was asked to see Zeyad Machado by Dr. Ordoñez to evaluate diplopia, L abducens palsy, concern for papilledema.    Chief Complaint:  Double vision    History of Present Illness:   Zeyad Machado is a 67 year old male with history of diabetes who presents from optometry clinic due to concerns for papilledema in setting of known L abducens palsy with primary symptoms of persistent headache and double vision.    Telephone Samoan interpretor used at bedside. History obtained from both patient and his daughter.    Symptoms first started about 3 months ago when patient suddenly woke up with double vision that only occurs if he keeps both eyes open (both horizontal and vertical). Since then, he has also had new onset headache about every other day that improves with tylenol - he typically does not have frequent headaches. He was recently evaluated at [facility] for these symptoms and found to have L abducens nerve palsy, for which he received an eye patch. He had an optometry evaluation today at, at that visit, there were concerns for papilledema, prompting ED evaluation and neurology consult.     He does have tenderness to palpation to his L temple. He's also developed general malaise on a daily basis. No jaw claudication or unintentional weight loss.      Patient obtained LP in ED with opening pressure 22. ESR 86. CRP 9.97.  Ophthalmology evaluated the patient and determined that he did not have any signs of papilledema.      ROS  A comprehensive ROS was performed and pertinent findings were included in HPI.     PMH  History reviewed. No pertinent past medical history.  History reviewed. No pertinent surgical history.    Medications   I have personally reviewed  "the patient's medication list.     Allergies  I have personally reviewed the patient's allergy list.     Social History  -No tobacco use    -Has not had alcohol in many years   -No recreational drug use     Family History    -No known family history of neurologic disease    Physical Examination   Vitals: BP (!) 132/91   Pulse 90   Temp 98.4  F (36.9  C) (Oral)   Resp 18   Ht 1.651 m (5' 5\")   Wt 68.9 kg (152 lb)   SpO2 97%   BMI 25.29 kg/m    General: Lying in bed, NAD  Head: Normocephalic/atraumatic - L temple tenderness to palpation  Eyes: no icterus  Cardiac: Regular rate per vitals  Pulmonary: non-labored, breathing comfortably on room air  GI: non-distended  Skin: No rash or lesion on exposed skin  Extremities: Extremities warm, no edema   Psych: Affect congruent    Neuro:  Mental status: Awake, alert, attentive. Language is fluent and coherent with intact comprehension.  Cranial nerves: Pupils equal and reactive to light - dilated to 8mm (had just had ophtho exam), dysconjugate gaze, L eye unable to abduct, facial sensation intact to light touch, Visual fields intact, has both horizontal and vertical diplopia when he opens both eyes - disappears if he only opens one eye, face symmetric, shoulder shrug strong, tongue midline, no dysarthria.   Motor: Normal bulk and tone. No abnormal movements. 5/5 strength bilaterally in deltoids, biceps, triceps, hip flexors, knee flexion, knee extension, plantarflexion, dorsiflexion.   Reflexes: Not tested  Sensory: Intact to light touch in proximal and distal aspects of all 4 extremities   Coordination: FNF and HS without ataxia or dysmetria.  Gait: Deferred     Investigations   I have personally reviewed pertinent labs, tests, and radiological imaging. Discussion of notable findings is included under Impression.     ESR 86  CRP 9.97  CBC/CMP unremarkable aside from normocytic anemia   Glucose 272    LP opening pressure: 22  Basic CSF labs pending     Was patient " transferred from outside hospital?   No    Impression  Zeyad Machado is a 67 year old male with history of diabetes who presents with subacute diplopia in setting of L abducens nerve palsy with elevated inflammatory markers and L temple tenderness, concerning for giant cell arteritis. Although there were some initial concerns for papilledema, this was not present on ophthalmology evaluation and patient's LP opening pressure was not significantly high (borderline high normal). Nevertheless, recommend obtaining brain images from [facility] to ensure that there's no other signs of increased intracranial pressure or abnormal contrast enhancing lesions that would warrant further neurologic workup.    Considered an isolated diabetic nerve palsy; however, this appears unlikely in setting of elevated inflammatory markers - would recommend UA to rule out infection as possible etiology of inflammation.    Recommendations  -Admit to medicine  -Please push 4/2023 MRI brain and MRA head and neck images from [facility] into our PACS system  -Hemoglobin A1c   -Urinalysis  -Agree with ophthalmology recommendations (initiation of methylprednisolone and vascular consult for temporal artery biopsy)    Thank you for involving Neurology in the care of Zeyad Machado.  Please do not hesitate to call with questions/concerns (consult pager [phone]).      Patient was discussed with Dr. Spangler.    Sandra Dietrich MD  PGY-2 Neurology Resident

## 2023-06-04 VITALS
HEIGHT: 65 IN | RESPIRATION RATE: 16 BRPM | DIASTOLIC BLOOD PRESSURE: 61 MMHG | HEART RATE: 89 BPM | WEIGHT: 152 LBS | OXYGEN SATURATION: 98 % | SYSTOLIC BLOOD PRESSURE: 122 MMHG | TEMPERATURE: 97.9 F | BODY MASS INDEX: 25.33 KG/M2

## 2023-06-04 PROBLEM — E11.9 TYPE 2 DIABETES MELLITUS WITHOUT COMPLICATION, WITHOUT LONG-TERM CURRENT USE OF INSULIN (H): Status: ACTIVE | Noted: 2023-06-04

## 2023-06-04 LAB
ANION GAP SERPL CALCULATED.3IONS-SCNC: 13 MMOL/L (ref 7–15)
BUN SERPL-MCNC: 21.4 MG/DL (ref 8–23)
CALCIUM SERPL-MCNC: 9 MG/DL (ref 8.8–10.2)
CHLORIDE SERPL-SCNC: 106 MMOL/L (ref 98–107)
CREAT SERPL-MCNC: 0.67 MG/DL (ref 0.67–1.17)
CRP SERPL-MCNC: 3.8 MG/L
DEPRECATED HCO3 PLAS-SCNC: 20 MMOL/L (ref 22–29)
ERYTHROCYTE [DISTWIDTH] IN BLOOD BY AUTOMATED COUNT: 12.5 % (ref 10–15)
GFR SERPL CREATININE-BSD FRML MDRD: >90 ML/MIN/1.73M2
GLUCOSE BLDC GLUCOMTR-MCNC: 209 MG/DL (ref 70–99)
GLUCOSE BLDC GLUCOMTR-MCNC: 222 MG/DL (ref 70–99)
GLUCOSE SERPL-MCNC: 251 MG/DL (ref 70–99)
HCT VFR BLD AUTO: 32.8 % (ref 40–53)
HGB BLD-MCNC: 10.6 G/DL (ref 13.3–17.7)
MCH RBC QN AUTO: 25.3 PG (ref 26.5–33)
MCHC RBC AUTO-ENTMCNC: 32.3 G/DL (ref 31.5–36.5)
MCV RBC AUTO: 78 FL (ref 78–100)
PLATELET # BLD AUTO: 379 10E3/UL (ref 150–450)
POTASSIUM SERPL-SCNC: 4 MMOL/L (ref 3.4–5.3)
RBC # BLD AUTO: 4.19 10E6/UL (ref 4.4–5.9)
SODIUM SERPL-SCNC: 139 MMOL/L (ref 136–145)
T PALLIDUM AB SER QL: NONREACTIVE
WBC # BLD AUTO: 15.8 10E3/UL (ref 4–11)

## 2023-06-04 PROCEDURE — 250N000013 HC RX MED GY IP 250 OP 250 PS 637

## 2023-06-04 PROCEDURE — 99232 SBSQ HOSP IP/OBS MODERATE 35: CPT | Mod: GC | Performed by: PSYCHIATRY & NEUROLOGY

## 2023-06-04 PROCEDURE — 36415 COLL VENOUS BLD VENIPUNCTURE: CPT

## 2023-06-04 PROCEDURE — 82310 ASSAY OF CALCIUM: CPT

## 2023-06-04 PROCEDURE — 99239 HOSP IP/OBS DSCHRG MGMT >30: CPT | Mod: GC | Performed by: STUDENT IN AN ORGANIZED HEALTH CARE EDUCATION/TRAINING PROGRAM

## 2023-06-04 PROCEDURE — 86140 C-REACTIVE PROTEIN: CPT

## 2023-06-04 PROCEDURE — 85014 HEMATOCRIT: CPT

## 2023-06-04 PROCEDURE — 250N000012 HC RX MED GY IP 250 OP 636 PS 637: Performed by: STUDENT IN AN ORGANIZED HEALTH CARE EDUCATION/TRAINING PROGRAM

## 2023-06-04 PROCEDURE — 250N000012 HC RX MED GY IP 250 OP 636 PS 637

## 2023-06-04 RX ORDER — ASPIRIN 81 MG/1
81 TABLET, CHEWABLE ORAL DAILY
Qty: 30 TABLET | Refills: 0 | Status: ON HOLD | OUTPATIENT
Start: 2023-06-05 | End: 2023-06-09

## 2023-06-04 RX ORDER — PREDNISONE 20 MG/1
80 TABLET ORAL DAILY
Qty: 120 TABLET | Refills: 0 | Status: ON HOLD | OUTPATIENT
Start: 2023-06-05 | End: 2023-06-09

## 2023-06-04 RX ADMIN — METFORMIN HYDROCHLORIDE 1000 MG: 500 TABLET, FILM COATED ORAL at 08:06

## 2023-06-04 RX ADMIN — LOSARTAN POTASSIUM 25 MG: 25 TABLET, FILM COATED ORAL at 08:06

## 2023-06-04 RX ADMIN — PREDNISONE 80 MG: 20 TABLET ORAL at 08:06

## 2023-06-04 RX ADMIN — INSULIN ASPART 3 UNITS: 100 INJECTION, SOLUTION INTRAVENOUS; SUBCUTANEOUS at 08:06

## 2023-06-04 RX ADMIN — INSULIN GLARGINE 10 UNITS: 100 INJECTION, SOLUTION SUBCUTANEOUS at 13:58

## 2023-06-04 RX ADMIN — ASPIRIN 81 MG CHEWABLE TABLET 81 MG: 81 TABLET CHEWABLE at 08:06

## 2023-06-04 ASSESSMENT — ACTIVITIES OF DAILY LIVING (ADL)
WEAR_GLASSES_OR_BLIND: NO
FALL_HISTORY_WITHIN_LAST_SIX_MONTHS: NO
ADLS_ACUITY_SCORE: 18
WALKING_OR_CLIMBING_STAIRS_DIFFICULTY: NO
ADLS_ACUITY_SCORE: 18
DOING_ERRANDS_INDEPENDENTLY_DIFFICULTY: NO
ADLS_ACUITY_SCORE: 18
TOILETING_ISSUES: NO
DIFFICULTY_EATING/SWALLOWING: NO
CONCENTRATING,_REMEMBERING_OR_MAKING_DECISIONS_DIFFICULTY: NO
ADLS_ACUITY_SCORE: 18
CHANGE_IN_FUNCTIONAL_STATUS_SINCE_ONSET_OF_CURRENT_ILLNESS/INJURY: NO
DRESSING/BATHING_DIFFICULTY: NO
ADLS_ACUITY_SCORE: 18
ADLS_ACUITY_SCORE: 18
ADLS_ACUITY_SCORE: 35
ADLS_ACUITY_SCORE: 18

## 2023-06-04 ASSESSMENT — VISUAL ACUITY: OU: DOUBLE VISION/DIPLOPIA

## 2023-06-04 NOTE — PLAN OF CARE
Status: admitted with sub acute L abducens palsy and concern for giant cell arteritis. Pt had temporal artery biopsy 6/3. Hx of DM2 and HTN.  Vitals: VSS   Neuros: A&Ox4. German speaking - interpretor required. Strengths 5/5 throughout. Double vision. Left eye dysconjugate due to left 6th nerve palsy.   IV: PIV SL  Labs/Electrolytes: BG checks ACHS - last BG of 222. Hemoglobin A1C of 8.1.   Resp/trach: WNL on RA. LS clear.   Diet: mod carb diet  Bowel status: BM PTA. BS audible  : voiding spontaneously   Skin: Temporal artery biopsy incision, covered with gauze  Pain: left head pain managed with PRN tylenol   Activity: SBA. Pt states he will intermittently get dizzy when walking due to double vision.   Social: family at bedside last evening, interpreting for patient  Plan: continue to monitor BG levels - metformin dose increased. High dose steroids. Follow current poc

## 2023-06-04 NOTE — PROVIDER NOTIFICATION
"Provider Notification: Med Maroon 2     \"Can you please order PRN tylenol? Also, do you want to put in insulin orders to correct bedtime blood glucose level @ 2200?\"    Thanks, Roz [phone]    Response: PRN tylenol ordered. Bedtime insulin already ordered.   "

## 2023-06-04 NOTE — PROGRESS NOTES
"[facility]  Neurology Consultation - Progress Note    Patient Name:  Zeyad Machado  Date of Service:  June 4, 2023    Subjective:    No acute events since initial consultation. Patient has temporal artery biopsy in the interim. He says his headache is improved, and does not have any new symptoms. Continues to have diplopia that is better when he wears an eye patch. He is eager to discharge home.    Objective:    Vitals: /61 (BP Location: Right arm)   Pulse 89   Temp 97.9  F (36.6  C) (Oral)   Resp 16   Ht 1.651 m (5' 5\")   Wt 68.9 kg (152 lb)   SpO2 98%   BMI 25.29 kg/m    General: sitting up on side of bed  Head: Atraumatic, left temporal bandage in place, no tenderness to palpation    Neurologic:  Mental status: Awake, alert, attentive. Language is fluent and coherent with intact comprehension (with ).  Cranial nerves: Pupils equal and reactive to light, dysconjugate gaze with left eye inward deviated and unable to abduct, facial sensation intact to light touch, Visual fields intact though reporting horizontal diplopia, face symmetric, shoulder shrug strong, tongue midline, no dysarthria.   Motor: Normal bulk and tone. No abnormal movements. 5/5 strength bilaterally in deltoids, biceps, triceps, hip flexors, knee flexion, knee extension, plantarflexion, dorsiflexion.   Reflexes: toes are mute  Sensory: Intact to light touch in proximal and distal aspects of all 4 extremities   Coordination: FNF without ataxia or dysmetria.  Gait: Deferred     Pertinent Investigations:    I have personally reviewed most recent and pertinent labs, tests, and radiological images.     Assessment  Zeyad Machado is a 67 year old male with PMH poorly controlled diabetes, HLD, HTN who presented to the ED with diplopia secondary to CN 6th palsy on the left. There was also a concern at one point for papilledema, however this was not present on ophthalmology evaluation here and patient " LP had normal opening pressure. He did have an MRI brain and MRA head and neck completed in April when symptoms started without acute pathology, though evidence of intracranial atherosclerosis.     However given nerve palsy and reports of pain in left temple, in conjunction with elevated inflammatory markers there was concern for GCA. 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 Patient was started on high dose steroids and underwent temporal artery biopsy yesterday, results pending. Will defer further steroid plan to ophthalmology at this time.    Recommendations:   - Continue steroids until biopsy results, remainder of steroid plan per ophthalmology  - ASA 81 mg daily for primary stroke prevention  - A1c goal < 7.0   - LDL goal < 100 and BP goal normotension  - Neurology will sign off at this time.    Thank you for involving Neurology in the care of Zeyad Machado.  Please do not hesitate to call with questions/concerns (consult pager [phone]).      Patient was seen and discussed with Dr. Spangler.    Janelle Vogel MD  Neurology Resident PGY3

## 2023-06-04 NOTE — PROGRESS NOTES
Arrived from: ED @ 1900 (previous RN settled patient as patient arrived near shift change)  Belongings/meds: clothing, shoes, cellphone, cellphone , vikings baseball hat. No medications  2 RN Skin Assessment Completed by: writer and Corinne MULLER RN  Non-intact findings documented (yes/no/NA): abrasion/scab behind right ear, scabbing on bilateral shins, peeling on bilateral feet, old scaring throughout. Temporal artery biopsy completed on left temporal area, covered with gauze.

## 2023-06-04 NOTE — PLAN OF CARE
Discharge time/date: 6/4 2pm   Walked or Wheelchair: walked, pt declined wheelchair   PIV removed: yes  Reviewed AVS with patient: yes & with daughter and son in law   Medication due times added to AVS in EPIC: yes   Verbalized understanding of discharge with teachback: yes  Medications retrieved from pharmacy: yes  Supplies sent home: Education handouts for insulin use in Angolan and english     Pt admitted for subacute L abducents palsy, concern for giant cell arteritis. Temporal artery biopsy performed 6/3. AOx4. Double vision. L eye dysconjugate. VSS on RA. Denies pain or headache. BM PTA. Mod carb diet. Up independently, walked halls several times. Insulin administration education and demonstration provided to patient and daughter - pt and daughter verbalized understanding of insulin administration. Educational handouts provided in english and Angolan. Plan to discharge home today. Continue with POC.

## 2023-06-04 NOTE — PROVIDER NOTIFICATION
"  Paged rickie 2 Severson, Hayley, MD  [phone]  [phone]      \"[id number] Zeyad Machado  Pt daughter is on the way to be a part of diabetes education/discharge paperwork. Can you sign the discharge orders when you have time? thank you!\"   "

## 2023-06-04 NOTE — DISCHARGE SUMMARY
"[facility]  Discharge Summary - Medicine & Pediatrics       Date of Admission:  6/2/2023  Date of Discharge:  6/4/2023  Discharging Provider: Dr. Adriana Lee  Discharge Service: Medicine Service, ZACKERY TEAM 2    Discharge Diagnoses   Left abducens nerve palsy, subacute  Left temporal tenderness c/f giant cell arteritis  Type 2 DM  Hyperlipidemia  Hypertension    Clinically Significant Risk Factors     # DMII: A1C = 8.1 % (Ref range: <5.7 %) within past 6 months  # Overweight: Estimated body mass index is 25.29 kg/m  as calculated from the following:    Height as of this encounter: 1.651 m (5' 5\").    Weight as of this encounter: 68.9 kg (152 lb).       Follow-ups Needed After Discharge   Follow-up Appointments     Adult [facility] Follow-up and recommended labs and tests      Follow up with primary care provider, [facility], on   6/5/23 for hospital follow- up. Diabetes management: increased metformin.   A prescription for glargine was sent to [facility] but   should not be started until the evening of 6/5 after seeing PCP. Dose to   be adjusted by PCP if needed if blood sugars continue to improve following   steroid dose reduction.    Follow-up with Ophthalmology this week.     Appointments on [place] and/or [facility] (with [facility] or [facility]   provider or service). Call [phone] if you haven't heard regarding   these appointments within 7 days of discharge.          Insulin regimen on steroids.     Unresulted Labs Ordered in the Past 30 Days of this Admission       Date and Time Order Name Status Description    6/3/2023  1:29 PM Quantiferon TB Gold Plus Purple Tube In process     6/3/2023  1:29 PM Quantiferon TB Gold Plus Yellow Tube In process     6/3/2023  1:29 PM Quantiferon TB Gold Plus Green Tube In process     6/3/2023  1:29 PM Quantiferon TB Gold Plus Grey Tube In process     6/3/2023 12:55 PM ANCA IgG by IFA with " Reflex to Titer In process     6/3/2023 12:55 PM Lyme disease DNA detection by PCR In process     6/3/2023 12:55 PM Angiotensin converting enzyme In process     6/2/2023  9:33 PM Cerebrospinal fluid Aerobic Bacterial Culture Routine Preliminary         These results will be followed up by Ophthalmology and Neurology.     Discharge Disposition   Discharged to home  Condition at discharge: Stable    Hospital Course   Zeyad Mcahado is a 67 year old male admitted on 6/2/2023. His past medical hx is significant for DM2, and HTN who is managed for sub acute L abducens palsy and concern for giant cell arteritis.    Left temporal tenderness  Concern for giant cell arteritis  Patient with exam suggestive of temporal tenderness without obvious myalgia, vision loss or changes, jaw claudication, or known rheumatologic disease history. Initially started on high dose IV steroids (IV methylprednisolone 1g daily), and then transitioned to 80 mg prednisone daily until he sees ophthalmology on Friday 6/9. Ophthalmology and Neurology consulted. Vascular completed a temporal artery biopsy without complication. Path results pending.     Left abducens nerve palsy, subacute (3 months)  Patient with 3 month history of L abducens nerve palsy. Neurology most suspicious for diabetic ischemic injury. However, will need to follow-up with Ophthalmology closely. Recommend risk factor control with ASA 81 mg daily, PTA atorvastatin, hypertension management as indicated.      Type 2 DM, A1c 8.1% on admission  Steroid induced hyperglycemia  - Increased metformin to 1000 mg BID  - Started insulin glargine at discharge (10 units), to be followed closely by PCP and adjusted with changes in steroid regimen. Per patient, would follow up with PCP in clinic on 6/5. Instructed to review blood sugars with PCP at that time as glargine dose may need reduction following change from stress dose steroid to prednisone 80 mg. Recommended patient check blood sugars  2-3x/day until follow-up. Patient underwent insulin education and hypoglycemia education.     Leukocytosis, steroid-induced    Hyperlipidemia  Hypertension  - PTA atorvastatin and losartan.     Consultations This Hospital Stay   OPHTHALMOLOGY IP CONSULT  VASCULAR SURGERY ADULT IP CONSULT  VASCULAR SURGERY ADULT IP CONSULT  VASCULAR SURGERY ADULT IP CONSULT    Code Status   Full Code       The patient was discussed with Dr. Lee.     Hayley Severson, MD-MPH, PGY-2  Medicine Service  [facility] [address] [address]  Phone: [phone]  ______________________________________________________________________    Physical Exam   Vital Signs: Temp: 97.9  F (36.6  C) Temp src: Oral BP: 122/61 Pulse: 89   Resp: 16 SpO2: 98 % O2 Device: None (Room air)    Weight: 152 lbs 0 oz    General: Pt NAD; had left temporal bandage in place and L eye partially squinted  HEENT: sclera clear, anicteric;  Respiratory: CTAB, no increased work of breathing  Card: RRR, normal S1 and S2, no murmurs  Abdomen:  soft and nontender, nondistended, no organomegaly, BS+  Neuro: Alert and oriented X 3; Left eye squinted, cannot abduct.   Skin: No rashes, skin warm and dry, no erythematous areas  Extremities: No edema      Primary Care Physician   [facility]    Discharge Orders      Activity    Your activity upon discharge: activity as tolerated     Adult [facility] Follow-up and recommended labs and tests    Follow up with primary care provider, [facility], on 6/5/23 for hospital follow- up. Diabetes management: increased metformin. A prescription for glargine was sent to [facility] but should not be started until the evening of 6/5 after seeing PCP. Dose to be adjusted by PCP if needed if blood sugars continue to improve following steroid dose reduction.    Follow-up with Ophthalmology this week.     Appointments on [place] and/or [place] (with [facility] or [facility] provider or service). Call [phone] if you haven't heard regarding these appointments within 7 days of discharge.     Reason for your hospital stay    You were admitted for eye changes and seen by the ophthalmology and neurology teams. The vision changes may be caused by an underlying inflammatory disease in the arteries or by complications of diabetes. You will need steroids to treat the possible inflammatory disease for now. You will need to follow up with ophthalmology in the next week for biopsy results, changes to steroid dosing, and for further testing or treatment if needed.     Steroids raise your blood sugar. You were treated with insulin while in the hospital and your metformin dose was raised. An insulin (glargine) prescription was sent to your pharmacy. Please DO NOT TAKE this medication until seeing your primary care provider on 6/5. Please check your blood sugar at home this evening and tomorrow morning. If your blood sugar is less than 70, drink some juice and recheck in 15 minutes.    You will need to discuss long term management of diabetes and short term management of high blood sugars while on steroids with your primary care provider.    The neurologists also recommended that you start taking aspirin 81 mg daily.     Diet    Follow this diet upon discharge: Orders Placed This Encounter      Moderate Consistent Carb (60 g CHO per Meal) Diet       Significant Results and Procedures   See EMR.    Discharge Medications   Current Discharge Medication List        START taking these medications    Details   aspirin (ASA) 81 MG chewable tablet Take 1 tablet (81 mg) by mouth daily  Qty: 30 tablet, Refills: 0    Associated Diagnoses: Giant cell arteritis (H)      insulin glargine (LANTUS PEN) 100 UNIT/ML pen Inject 10 Units Subcutaneous At Bedtime  Qty: 15 mL, Refills: 0    Comments: If Lantus is not covered by insurance, may substitute Basaglar or Semglee or other insulin  glargine product per insurance preference at same dose and frequency.    Associated Diagnoses: Type 2 diabetes mellitus without complication, without long-term current use of insulin (H)      metFORMIN (GLUCOPHAGE) 1000 MG tablet Take 1 tablet (1,000 mg) by mouth 2 times daily (with meals)  Qty: 60 tablet, Refills: 0    Associated Diagnoses: Type 2 diabetes mellitus without complication, without long-term current use of insulin (H)      predniSONE (DELTASONE) 20 MG tablet Take 4 tablets (80 mg) by mouth daily  Qty: 120 tablet, Refills: 0    Associated Diagnoses: Giant cell arteritis (H)           CONTINUE these medications which have NOT CHANGED    Details   atorvastatin (LIPITOR) 40 MG tablet TOME CÉSAR TABLETA POR LA BOCA CADA NOCHE      losartan (COZAAR) 25 MG tablet TOME CÉSAR TABLETA POR LA BOCA CADA NAREN      SENNA-TIME 8.6 MG tablet TAKE ONE TABLET BY MOUTH AS DIRECTED 1-2 veces EVERY DAY AS NEEDED para estrenimiento           STOP taking these medications       metFORMIN (GLUCOPHAGE XR) 500 MG 24 hr tablet Comments:   Reason for Stopping:             Allergies   No Known Allergies

## 2023-06-05 ENCOUNTER — HOSPITAL ENCOUNTER (INPATIENT)
Facility: CLINIC | Age: 68
LOS: 4 days | Discharge: HOME | DRG: 328 | End: 2023-06-09
Attending: SURGERY | Admitting: SURGERY

## 2023-06-05 ENCOUNTER — TELEPHONE (OUTPATIENT)
Dept: OPHTHALMOLOGY | Facility: CLINIC | Age: 68
End: 2023-06-05

## 2023-06-05 ENCOUNTER — HOSPITAL ENCOUNTER (EMERGENCY)
Facility: CLINIC | Age: 68
Discharge: HOME | End: 2023-06-05
Attending: EMERGENCY MEDICINE | Admitting: EMERGENCY MEDICINE

## 2023-06-05 ENCOUNTER — APPOINTMENT (OUTPATIENT)
Dept: ULTRASOUND IMAGING | Facility: CLINIC | Age: 68
End: 2023-06-05
Attending: EMERGENCY MEDICINE

## 2023-06-05 ENCOUNTER — ANESTHESIA EVENT (OUTPATIENT)
Dept: SURGERY | Facility: CLINIC | Age: 68
DRG: 328 | End: 2023-06-05

## 2023-06-05 ENCOUNTER — APPOINTMENT (OUTPATIENT)
Dept: CT IMAGING | Facility: CLINIC | Age: 68
End: 2023-06-05
Attending: EMERGENCY MEDICINE

## 2023-06-05 ENCOUNTER — TRANSCRIBE ORDERS (OUTPATIENT)
Dept: OTHER | Age: 68
End: 2023-06-05

## 2023-06-05 ENCOUNTER — ANESTHESIA (OUTPATIENT)
Dept: SURGERY | Facility: CLINIC | Age: 68
DRG: 328 | End: 2023-06-05

## 2023-06-05 VITALS
DIASTOLIC BLOOD PRESSURE: 90 MMHG | OXYGEN SATURATION: 99 % | TEMPERATURE: 98.3 F | RESPIRATION RATE: 15 BRPM | SYSTOLIC BLOOD PRESSURE: 157 MMHG | HEART RATE: 102 BPM

## 2023-06-05 DIAGNOSIS — G89.18 ACUTE POST-OPERATIVE PAIN: Primary | ICD-10-CM

## 2023-06-05 DIAGNOSIS — H49.22 PALSY OF LEFT SIXTH CRANIAL NERVE ON EXAMINATION: Primary | ICD-10-CM

## 2023-06-05 DIAGNOSIS — R19.8 PERFORATED VISCUS: ICD-10-CM

## 2023-06-05 DIAGNOSIS — K25.1 ACUTE GASTRIC ULCER WITH PERFORATION (H): ICD-10-CM

## 2023-06-05 DIAGNOSIS — R10.9 ACUTE ABDOMINAL PAIN: ICD-10-CM

## 2023-06-05 DIAGNOSIS — K66.8 PNEUMOPERITONEUM: ICD-10-CM

## 2023-06-05 LAB
ACE SERPL-CCNC: 57 U/L
ALBUMIN SERPL BCG-MCNC: 4.2 G/DL (ref 3.5–5.2)
ALBUMIN UR-MCNC: 50 MG/DL
ALP SERPL-CCNC: 100 U/L (ref 40–129)
ALT SERPL W P-5'-P-CCNC: 21 U/L (ref 10–50)
ANCA AB PATTERN SER IF-IMP: NORMAL
ANION GAP SERPL CALCULATED.3IONS-SCNC: 16 MMOL/L (ref 7–15)
APPEARANCE UR: CLEAR
AST SERPL W P-5'-P-CCNC: 17 U/L (ref 10–50)
ATRIAL RATE - MUSE: 88 BPM
BASOPHILS # BLD AUTO: 0 10E3/UL (ref 0–0.2)
BASOPHILS NFR BLD AUTO: 0 %
BILIRUB SERPL-MCNC: 0.4 MG/DL
BILIRUB UR QL STRIP: NEGATIVE
BUN SERPL-MCNC: 30.6 MG/DL (ref 8–23)
C-ANCA TITR SER IF: NORMAL {TITER}
CALCIUM SERPL-MCNC: 9.2 MG/DL (ref 8.8–10.2)
CHLORIDE SERPL-SCNC: 97 MMOL/L (ref 98–107)
COLOR UR AUTO: ABNORMAL
CREAT SERPL-MCNC: 0.72 MG/DL (ref 0.67–1.17)
DEPRECATED HCO3 PLAS-SCNC: 20 MMOL/L (ref 22–29)
DIASTOLIC BLOOD PRESSURE - MUSE: NORMAL MMHG
EOSINOPHIL # BLD AUTO: 0 10E3/UL (ref 0–0.7)
EOSINOPHIL NFR BLD AUTO: 0 %
ERYTHROCYTE [DISTWIDTH] IN BLOOD BY AUTOMATED COUNT: 12.7 % (ref 10–15)
GAMMA INTERFERON BACKGROUND BLD IA-ACNC: 0.01 IU/ML
GFR SERPL CREATININE-BSD FRML MDRD: >90 ML/MIN/1.73M2
GLUCOSE BLDC GLUCOMTR-MCNC: 165 MG/DL (ref 70–99)
GLUCOSE BLDC GLUCOMTR-MCNC: 204 MG/DL (ref 70–99)
GLUCOSE SERPL-MCNC: 226 MG/DL (ref 70–99)
GLUCOSE UR STRIP-MCNC: 500 MG/DL
HCT VFR BLD AUTO: 37.4 % (ref 40–53)
HGB BLD-MCNC: 12 G/DL (ref 13.3–17.7)
HGB UR QL STRIP: NEGATIVE
HOLD SPECIMEN: NORMAL
HYALINE CASTS: 1 /LPF
IMM GRANULOCYTES # BLD: 0 10E3/UL
IMM GRANULOCYTES NFR BLD: 0 %
INTERPRETATION ECG - MUSE: NORMAL
KETONES UR STRIP-MCNC: 10 MG/DL
LEUKOCYTE ESTERASE UR QL STRIP: NEGATIVE
LIPASE SERPL-CCNC: 50 U/L (ref 13–60)
LYMPHOCYTES # BLD AUTO: 1.4 10E3/UL (ref 0.8–5.3)
LYMPHOCYTES NFR BLD AUTO: 14 %
M TB IFN-G BLD-IMP: NEGATIVE
M TB IFN-G CD4+ BCKGRND COR BLD-ACNC: 3.71 IU/ML
MCH RBC QN AUTO: 25.1 PG (ref 26.5–33)
MCHC RBC AUTO-ENTMCNC: 32.1 G/DL (ref 31.5–36.5)
MCV RBC AUTO: 78 FL (ref 78–100)
MITOGEN IGNF BCKGRD COR BLD-ACNC: 0.02 IU/ML
MITOGEN IGNF BCKGRD COR BLD-ACNC: 0.02 IU/ML
MONOCYTES # BLD AUTO: 0.3 10E3/UL (ref 0–1.3)
MONOCYTES NFR BLD AUTO: 3 %
MUCOUS THREADS #/AREA URNS LPF: PRESENT /LPF
NEUTROPHILS # BLD AUTO: 8.1 10E3/UL (ref 1.6–8.3)
NEUTROPHILS NFR BLD AUTO: 83 %
NITRATE UR QL: NEGATIVE
NRBC # BLD AUTO: 0 10E3/UL
NRBC BLD AUTO-RTO: 0 /100
P AXIS - MUSE: 59 DEGREES
PH UR STRIP: 6 [PH] (ref 5–7)
PLATELET # BLD AUTO: 467 10E3/UL (ref 150–450)
POTASSIUM SERPL-SCNC: 3.8 MMOL/L (ref 3.4–5.3)
PR INTERVAL - MUSE: 134 MS
PROT SERPL-MCNC: 7.8 G/DL (ref 6.4–8.3)
QRS DURATION - MUSE: 84 MS
QT - MUSE: 358 MS
QTC - MUSE: 433 MS
QUANTIFERON MITOGEN: 3.72 IU/ML
QUANTIFERON NIL TUBE: 0.01 IU/ML
QUANTIFERON TB1 TUBE: 0.03 IU/ML
QUANTIFERON TB2 TUBE: 0.03
R AXIS - MUSE: 31 DEGREES
RADIOLOGIST FLAGS: ABNORMAL
RBC # BLD AUTO: 4.78 10E6/UL (ref 4.4–5.9)
RBC URINE: <1 /HPF
SODIUM SERPL-SCNC: 133 MMOL/L (ref 136–145)
SP GR UR STRIP: 1.02 (ref 1–1.03)
SYSTOLIC BLOOD PRESSURE - MUSE: NORMAL MMHG
T AXIS - MUSE: 42 DEGREES
TROPONIN T SERPL HS-MCNC: 10 NG/L
UROBILINOGEN UR STRIP-MCNC: NORMAL MG/DL
VENTRICULAR RATE- MUSE: 88 BPM
WBC # BLD AUTO: 9.8 10E3/UL (ref 4–11)
WBC URINE: 1 /HPF

## 2023-06-05 PROCEDURE — 76705 ECHO EXAM OF ABDOMEN: CPT | Mod: 26 | Performed by: RADIOLOGY

## 2023-06-05 PROCEDURE — 84484 ASSAY OF TROPONIN QUANT: CPT | Performed by: EMERGENCY MEDICINE

## 2023-06-05 PROCEDURE — 250N000025 HC SEVOFLURANE, PER MIN: Performed by: SURGERY

## 2023-06-05 PROCEDURE — 370N000017 HC ANESTHESIA TECHNICAL FEE, PER MIN: Performed by: SURGERY

## 2023-06-05 PROCEDURE — 250N000011 HC RX IP 250 OP 636: Performed by: NURSE ANESTHETIST, CERTIFIED REGISTERED

## 2023-06-05 PROCEDURE — 96374 THER/PROPH/DIAG INJ IV PUSH: CPT | Mod: 59 | Performed by: EMERGENCY MEDICINE

## 2023-06-05 PROCEDURE — 43659 UNLISTED LAPS PX STOMACH: CPT | Performed by: SURGERY

## 2023-06-05 PROCEDURE — 258N000003 HC RX IP 258 OP 636: Performed by: NURSE ANESTHETIST, CERTIFIED REGISTERED

## 2023-06-05 PROCEDURE — 99291 CRITICAL CARE FIRST HOUR: CPT | Mod: 25 | Performed by: EMERGENCY MEDICINE

## 2023-06-05 PROCEDURE — 250N000011 HC RX IP 250 OP 636: Performed by: SURGERY

## 2023-06-05 PROCEDURE — 99291 CRITICAL CARE FIRST HOUR: CPT | Performed by: EMERGENCY MEDICINE

## 2023-06-05 PROCEDURE — 36415 COLL VENOUS BLD VENIPUNCTURE: CPT | Performed by: EMERGENCY MEDICINE

## 2023-06-05 PROCEDURE — 360N000076 HC SURGERY LEVEL 3, PER MIN: Performed by: SURGERY

## 2023-06-05 PROCEDURE — 74177 CT ABD & PELVIS W/CONTRAST: CPT

## 2023-06-05 PROCEDURE — 81001 URINALYSIS AUTO W/SCOPE: CPT | Performed by: EMERGENCY MEDICINE

## 2023-06-05 PROCEDURE — 80053 COMPREHEN METABOLIC PANEL: CPT | Performed by: EMERGENCY MEDICINE

## 2023-06-05 PROCEDURE — 0DQ64ZZ REPAIR STOMACH, PERCUTANEOUS ENDOSCOPIC APPROACH: ICD-10-PCS | Performed by: SURGERY

## 2023-06-05 PROCEDURE — 43659 UNLISTED LAPS PX STOMACH: CPT | Mod: AS | Performed by: PHYSICIAN ASSISTANT

## 2023-06-05 PROCEDURE — 83690 ASSAY OF LIPASE: CPT | Performed by: EMERGENCY MEDICINE

## 2023-06-05 PROCEDURE — 250N000011 HC RX IP 250 OP 636: Performed by: EMERGENCY MEDICINE

## 2023-06-05 PROCEDURE — 120N000001 HC R&B MED SURG/OB

## 2023-06-05 PROCEDURE — 96376 TX/PRO/DX INJ SAME DRUG ADON: CPT | Performed by: EMERGENCY MEDICINE

## 2023-06-05 PROCEDURE — C9113 INJ PANTOPRAZOLE SODIUM, VIA: HCPCS | Performed by: EMERGENCY MEDICINE

## 2023-06-05 PROCEDURE — 272N000001 HC OR GENERAL SUPPLY STERILE: Performed by: SURGERY

## 2023-06-05 PROCEDURE — 76705 ECHO EXAM OF ABDOMEN: CPT

## 2023-06-05 PROCEDURE — 93005 ELECTROCARDIOGRAM TRACING: CPT | Mod: RTG

## 2023-06-05 PROCEDURE — 96361 HYDRATE IV INFUSION ADD-ON: CPT | Performed by: EMERGENCY MEDICINE

## 2023-06-05 PROCEDURE — 74177 CT ABD & PELVIS W/CONTRAST: CPT | Mod: 26 | Performed by: RADIOLOGY

## 2023-06-05 PROCEDURE — 250N000009 HC RX 250: Performed by: NURSE ANESTHETIST, CERTIFIED REGISTERED

## 2023-06-05 PROCEDURE — 710N000009 HC RECOVERY PHASE 1, LEVEL 1, PER MIN: Performed by: SURGERY

## 2023-06-05 PROCEDURE — 258N000001 HC RX 258: Performed by: SURGERY

## 2023-06-05 PROCEDURE — 0DU647Z SUPPLEMENT STOMACH WITH AUTOLOGOUS TISSUE SUBSTITUTE, PERCUTANEOUS ENDOSCOPIC APPROACH: ICD-10-PCS | Performed by: SURGERY

## 2023-06-05 PROCEDURE — 999N000141 HC STATISTIC PRE-PROCEDURE NURSING ASSESSMENT: Performed by: SURGERY

## 2023-06-05 PROCEDURE — 258N000003 HC RX IP 258 OP 636: Performed by: EMERGENCY MEDICINE

## 2023-06-05 PROCEDURE — 96375 TX/PRO/DX INJ NEW DRUG ADDON: CPT | Performed by: EMERGENCY MEDICINE

## 2023-06-05 PROCEDURE — 85041 AUTOMATED RBC COUNT: CPT | Performed by: EMERGENCY MEDICINE

## 2023-06-05 RX ORDER — ONDANSETRON 4 MG/1
4 TABLET, ORALLY DISINTEGRATING ORAL EVERY 30 MIN PRN
Status: DISCONTINUED | OUTPATIENT
Start: 2023-06-05 | End: 2023-06-06

## 2023-06-05 RX ORDER — HYDROMORPHONE HYDROCHLORIDE 1 MG/ML
0.2 INJECTION, SOLUTION INTRAMUSCULAR; INTRAVENOUS; SUBCUTANEOUS EVERY 5 MIN PRN
Status: DISCONTINUED | OUTPATIENT
Start: 2023-06-05 | End: 2023-06-06

## 2023-06-05 RX ORDER — PROPOFOL 10 MG/ML
INJECTION, EMULSION INTRAVENOUS PRN
Status: DISCONTINUED | OUTPATIENT
Start: 2023-06-05 | End: 2023-06-05

## 2023-06-05 RX ORDER — PIPERACILLIN SODIUM, TAZOBACTAM SODIUM 4; .5 G/20ML; G/20ML
4.5 INJECTION, POWDER, LYOPHILIZED, FOR SOLUTION INTRAVENOUS ONCE
Status: COMPLETED | OUTPATIENT
Start: 2023-06-05 | End: 2023-06-05

## 2023-06-05 RX ORDER — FENTANYL CITRATE 50 UG/ML
INJECTION, SOLUTION INTRAMUSCULAR; INTRAVENOUS PRN
Status: DISCONTINUED | OUTPATIENT
Start: 2023-06-05 | End: 2023-06-05

## 2023-06-05 RX ORDER — ACETAMINOPHEN 325 MG/1
975 TABLET ORAL ONCE
Status: CANCELLED | OUTPATIENT
Start: 2023-06-05 | End: 2023-06-05

## 2023-06-05 RX ORDER — LIDOCAINE HYDROCHLORIDE 20 MG/ML
INJECTION, SOLUTION INFILTRATION; PERINEURAL PRN
Status: DISCONTINUED | OUTPATIENT
Start: 2023-06-05 | End: 2023-06-05

## 2023-06-05 RX ORDER — ENOXAPARIN SODIUM 100 MG/ML
40 INJECTION SUBCUTANEOUS
Status: CANCELLED | OUTPATIENT
Start: 2023-06-05

## 2023-06-05 RX ORDER — ONDANSETRON 2 MG/ML
4 INJECTION INTRAMUSCULAR; INTRAVENOUS EVERY 30 MIN PRN
Status: DISCONTINUED | OUTPATIENT
Start: 2023-06-05 | End: 2023-06-05 | Stop reason: HOSPADM

## 2023-06-05 RX ORDER — FENTANYL CITRATE 50 UG/ML
25 INJECTION, SOLUTION INTRAMUSCULAR; INTRAVENOUS EVERY 5 MIN PRN
Status: DISCONTINUED | OUTPATIENT
Start: 2023-06-05 | End: 2023-06-06

## 2023-06-05 RX ORDER — HYDROMORPHONE HYDROCHLORIDE 1 MG/ML
0.4 INJECTION, SOLUTION INTRAMUSCULAR; INTRAVENOUS; SUBCUTANEOUS EVERY 5 MIN PRN
Status: DISCONTINUED | OUTPATIENT
Start: 2023-06-05 | End: 2023-06-06

## 2023-06-05 RX ORDER — DEXAMETHASONE SODIUM PHOSPHATE 4 MG/ML
INJECTION, SOLUTION INTRA-ARTICULAR; INTRALESIONAL; INTRAMUSCULAR; INTRAVENOUS; SOFT TISSUE PRN
Status: DISCONTINUED | OUTPATIENT
Start: 2023-06-05 | End: 2023-06-05

## 2023-06-05 RX ORDER — FENTANYL CITRATE 50 UG/ML
50 INJECTION, SOLUTION INTRAMUSCULAR; INTRAVENOUS EVERY 5 MIN PRN
Status: DISCONTINUED | OUTPATIENT
Start: 2023-06-05 | End: 2023-06-06

## 2023-06-05 RX ORDER — ONDANSETRON 2 MG/ML
4 INJECTION INTRAMUSCULAR; INTRAVENOUS EVERY 30 MIN PRN
Status: DISCONTINUED | OUTPATIENT
Start: 2023-06-05 | End: 2023-06-06

## 2023-06-05 RX ORDER — BUPIVACAINE HYDROCHLORIDE 2.5 MG/ML
INJECTION, SOLUTION INFILTRATION; PERINEURAL PRN
Status: DISCONTINUED | OUTPATIENT
Start: 2023-06-05 | End: 2023-06-06

## 2023-06-05 RX ORDER — CEFAZOLIN SODIUM/WATER 2 G/20 ML
SYRINGE (ML) INTRAVENOUS PRN
Status: DISCONTINUED | OUTPATIENT
Start: 2023-06-05 | End: 2023-06-05

## 2023-06-05 RX ORDER — SODIUM CHLORIDE, SODIUM LACTATE, POTASSIUM CHLORIDE, CALCIUM CHLORIDE 600; 310; 30; 20 MG/100ML; MG/100ML; MG/100ML; MG/100ML
125 INJECTION, SOLUTION INTRAVENOUS CONTINUOUS
Status: DISCONTINUED | OUTPATIENT
Start: 2023-06-05 | End: 2023-06-05 | Stop reason: HOSPADM

## 2023-06-05 RX ORDER — SODIUM CHLORIDE, SODIUM LACTATE, POTASSIUM CHLORIDE, CALCIUM CHLORIDE 600; 310; 30; 20 MG/100ML; MG/100ML; MG/100ML; MG/100ML
INJECTION, SOLUTION INTRAVENOUS CONTINUOUS
Status: DISCONTINUED | OUTPATIENT
Start: 2023-06-05 | End: 2023-06-06

## 2023-06-05 RX ORDER — HYDROMORPHONE HYDROCHLORIDE 1 MG/ML
0.5 INJECTION, SOLUTION INTRAMUSCULAR; INTRAVENOUS; SUBCUTANEOUS
Status: COMPLETED | OUTPATIENT
Start: 2023-06-05 | End: 2023-06-05

## 2023-06-05 RX ORDER — IOPAMIDOL 755 MG/ML
75 INJECTION, SOLUTION INTRAVASCULAR ONCE
Status: COMPLETED | OUTPATIENT
Start: 2023-06-05 | End: 2023-06-05

## 2023-06-05 RX ORDER — SODIUM CHLORIDE, SODIUM LACTATE, POTASSIUM CHLORIDE, CALCIUM CHLORIDE 600; 310; 30; 20 MG/100ML; MG/100ML; MG/100ML; MG/100ML
INJECTION, SOLUTION INTRAVENOUS CONTINUOUS PRN
Status: DISCONTINUED | OUTPATIENT
Start: 2023-06-05 | End: 2023-06-05

## 2023-06-05 RX ORDER — ONDANSETRON 2 MG/ML
INJECTION INTRAMUSCULAR; INTRAVENOUS PRN
Status: DISCONTINUED | OUTPATIENT
Start: 2023-06-05 | End: 2023-06-05

## 2023-06-05 RX ORDER — HYDROMORPHONE HYDROCHLORIDE 1 MG/ML
0.5 INJECTION, SOLUTION INTRAMUSCULAR; INTRAVENOUS; SUBCUTANEOUS
Status: DISCONTINUED | OUTPATIENT
Start: 2023-06-05 | End: 2023-06-05 | Stop reason: HOSPADM

## 2023-06-05 RX ADMIN — DEXAMETHASONE SODIUM PHOSPHATE 4 MG: 4 INJECTION, SOLUTION INTRA-ARTICULAR; INTRALESIONAL; INTRAMUSCULAR; INTRAVENOUS; SOFT TISSUE at 21:57

## 2023-06-05 RX ADMIN — HYDROMORPHONE HYDROCHLORIDE 0.3 MG: 1 INJECTION, SOLUTION INTRAMUSCULAR; INTRAVENOUS; SUBCUTANEOUS at 22:49

## 2023-06-05 RX ADMIN — SODIUM CHLORIDE, POTASSIUM CHLORIDE, SODIUM LACTATE AND CALCIUM CHLORIDE 125 ML/HR: 600; 310; 30; 20 INJECTION, SOLUTION INTRAVENOUS at 16:25

## 2023-06-05 RX ADMIN — FENTANYL CITRATE 50 MCG: 50 INJECTION, SOLUTION INTRAMUSCULAR; INTRAVENOUS at 22:24

## 2023-06-05 RX ADMIN — ONDANSETRON 4 MG: 2 INJECTION INTRAMUSCULAR; INTRAVENOUS at 16:48

## 2023-06-05 RX ADMIN — SODIUM CHLORIDE, POTASSIUM CHLORIDE, SODIUM LACTATE AND CALCIUM CHLORIDE 1000 ML: 600; 310; 30; 20 INJECTION, SOLUTION INTRAVENOUS at 15:31

## 2023-06-05 RX ADMIN — FENTANYL CITRATE 50 MCG: 50 INJECTION, SOLUTION INTRAMUSCULAR; INTRAVENOUS at 21:48

## 2023-06-05 RX ADMIN — LIDOCAINE HYDROCHLORIDE 100 MG: 20 INJECTION, SOLUTION INFILTRATION; PERINEURAL at 21:48

## 2023-06-05 RX ADMIN — IOPAMIDOL 75 ML: 755 INJECTION, SOLUTION INTRAVENOUS at 17:02

## 2023-06-05 RX ADMIN — PROPOFOL 140 MG: 10 INJECTION, EMULSION INTRAVENOUS at 21:48

## 2023-06-05 RX ADMIN — Medication 2 G: at 21:48

## 2023-06-05 RX ADMIN — SUCCINYLCHOLINE CHLORIDE 100 MG: 20 INJECTION, SOLUTION INTRAMUSCULAR; INTRAVENOUS; PARENTERAL at 21:48

## 2023-06-05 RX ADMIN — PIPERACILLIN SODIUM AND TAZOBACTAM SODIUM 4.5 G: 4; .5 INJECTION, POWDER, LYOPHILIZED, FOR SOLUTION INTRAVENOUS at 18:17

## 2023-06-05 RX ADMIN — HYDROMORPHONE HYDROCHLORIDE 0.2 MG: 1 INJECTION, SOLUTION INTRAMUSCULAR; INTRAVENOUS; SUBCUTANEOUS at 23:01

## 2023-06-05 RX ADMIN — SUGAMMADEX 200 MG: 100 INJECTION, SOLUTION INTRAVENOUS at 22:51

## 2023-06-05 RX ADMIN — ONDANSETRON 4 MG: 2 INJECTION INTRAMUSCULAR; INTRAVENOUS at 15:31

## 2023-06-05 RX ADMIN — SODIUM CHLORIDE, POTASSIUM CHLORIDE, SODIUM LACTATE AND CALCIUM CHLORIDE: 600; 310; 30; 20 INJECTION, SOLUTION INTRAVENOUS at 22:32

## 2023-06-05 RX ADMIN — ROCURONIUM BROMIDE 50 MG: 50 INJECTION, SOLUTION INTRAVENOUS at 21:54

## 2023-06-05 RX ADMIN — HYDROMORPHONE HYDROCHLORIDE 0.5 MG: 1 INJECTION, SOLUTION INTRAMUSCULAR; INTRAVENOUS; SUBCUTANEOUS at 16:48

## 2023-06-05 RX ADMIN — SODIUM CHLORIDE, POTASSIUM CHLORIDE, SODIUM LACTATE AND CALCIUM CHLORIDE: 600; 310; 30; 20 INJECTION, SOLUTION INTRAVENOUS at 21:39

## 2023-06-05 RX ADMIN — PANTOPRAZOLE SODIUM 80 MG: 40 INJECTION, POWDER, FOR SOLUTION INTRAVENOUS at 18:12

## 2023-06-05 RX ADMIN — PHENYLEPHRINE HYDROCHLORIDE 100 MCG: 10 INJECTION INTRAVENOUS at 22:06

## 2023-06-05 RX ADMIN — ONDANSETRON 4 MG: 2 INJECTION INTRAMUSCULAR; INTRAVENOUS at 21:57

## 2023-06-05 RX ADMIN — HYDROMORPHONE HYDROCHLORIDE 0.5 MG: 1 INJECTION, SOLUTION INTRAMUSCULAR; INTRAVENOUS; SUBCUTANEOUS at 15:30

## 2023-06-05 RX ADMIN — PHENYLEPHRINE HYDROCHLORIDE 200 MCG: 10 INJECTION INTRAVENOUS at 22:09

## 2023-06-05 RX ADMIN — PHENYLEPHRINE HYDROCHLORIDE 200 MCG: 10 INJECTION INTRAVENOUS at 22:36

## 2023-06-05 ASSESSMENT — ACTIVITIES OF DAILY LIVING (ADL)
ADLS_ACUITY_SCORE: 35
ADLS_ACUITY_SCORE: 33

## 2023-06-05 ASSESSMENT — ENCOUNTER SYMPTOMS
DYSRHYTHMIAS: 0
SEIZURES: 0
ORTHOPNEA: 0

## 2023-06-05 ASSESSMENT — LIFESTYLE VARIABLES: TOBACCO_USE: 1

## 2023-06-05 NOTE — TELEPHONE ENCOUNTER
[facility] Call Center    Phone Message    May a detailed message be left on voicemail: yes     Reason for Call: Appointment Intake    Referring Provider Name: Adriana Lee MD  Diagnosis and/or Symptoms: Follow-up with Ophthalmology this week.     This is a hospital follow up    Action Taken: Message routed to:  [facility] ([facility] eye    Travel Screening: Not Applicable

## 2023-06-05 NOTE — ED TRIAGE NOTES
From home for sudden onset of abdominal pain, N/V and CP that started suddenly around 1300  Recent admission for c/f Giant cell arteritis, DCd yesterday  Fall this morning in bathroom, did not hit his head.     Triage Assessment     Row Name 06/05/23 8833       Triage Assessment (Adult)    Airway WDL WDL       Respiratory WDL    Respiratory WDL WDL       Skin Circulation/Temperature WDL    Skin Circulation/Temperature WDL WDL       Cardiac WDL    Cardiac WDL X;chest pain       Peripheral/Neurovascular WDL    Peripheral Neurovascular WDL WDL       Cognitive/Neuro/Behavioral WDL    Cognitive/Neuro/Behavioral WDL WDL

## 2023-06-05 NOTE — CONSULTS
"EGS Surgery Consult  2023    Zeyad Machado  : 1955    Date of Service: 2023 5:58 PM    Assessment and Plan:  Zeyad Machado is a 67 year old male with a history of HTN, HLD, DM2, recently admitted and started on high dose prednisone for giant cell arteritis who presented to the ED for evaluation of acute abdominal pain, found to have pneumoperitoneum concerning for hollow viscous perforation. Fortunately, he is hemodynamically normal and well appearing. Abdomen is diffusely tender but not peritonitic. He has an indication for urgent/emergent surgery. Unfortunately, due to OR capacity, patient will need to be transferred to OSH for this. Discussed with ED who will coordinate.    - acute surgergical intervention indicated, plan for transfer to another facility   - NPO, IVF  - received IV zosyn here    Patient and plan discussed with chief resident and staff, Dr. Kaleb Pringle MD  General Surgery Resident  --------------------------------------------------------------------  History of Present Illness:    Zeyad Machado is a 67 year old male with a history notable for HTN, HLD, DM2, recently admitted and worked up for giant cell arteritis who presented to the ED for evaluation of abdominal pain.  He was discharged from the hospital yesterday after presenting with an abducens nerve palsy now status post left temporal artery biopsy, started on high-dose steroids and discharged on 80 mg prednisone daily.  He states that this afternoon around 11 AM he developed acute onset upper abdominal pain radiating to his chest with associated nausea.  He has felt like vomiting but \"can't\".  He has never had this pain before.  He denies fevers, vomiting.  He denies NSAID use.  No prior abdominal surgeries.    Past Medical History:  Diabetes type 2  Hypertension  Hyperlipidemia  Possible giant cell arteritis    Past Surgical History  No prior abdominal surgeries    Family History:  No family history on " file.    Social History:  Daily smoker    Medications:  Current Facility-Administered Medications   Medication     HYDROmorphone (PF) (DILAUDID) injection 0.5 mg     lactated ringers infusion     ondansetron (ZOFRAN) injection 4 mg     pantoprazole (PROTONIX) IV push injection 80 mg     pharmacy alert - intermittent dosing     piperacillin-tazobactam (ZOSYN) 4.5 g vial to attach to  mL bag     Current Outpatient Medications   Medication     aspirin (ASA) 81 MG chewable tablet     atorvastatin (LIPITOR) 40 MG tablet     insulin glargine (LANTUS PEN) 100 UNIT/ML pen     losartan (COZAAR) 25 MG tablet     metFORMIN (GLUCOPHAGE) 1000 MG tablet     predniSONE (DELTASONE) 20 MG tablet     SENNA-TIME 8.6 MG tablet       Allergies:  No Known Allergies    Review of Symptoms:  A 10 point review of symptoms has been conducted and is negative except for that mentioned in the above HPI.    Physical Exam:    Blood pressure (!) 157/86, pulse 83, temperature 98.3  F (36.8  C), temperature source Temporal, resp. rate 15, SpO2 97 %.  Gen:    Lying in bed in NAD, pleasant  HEENT: Normocephalic and atraumatic  CV:  RRR  Pulm:  Non-labored breathing on room air  Abd:  Soft, diffuse moderate tenderness most pronounced across the upper abdomen with intermittent voluntary guarding, no peritonitis  Ext:  Warm and well perfused, no obvious deformities    Labs:  All labs reviewed, notable for:  WBC 5.8  Hemoglobin 12    Imaging:  US Abdomen Limited (RUQ)    Result Date: 6/5/2023  EXAMINATION: Limited Abdominal Ultrasound, 6/5/2023 5:04 PM COMPARISON: None. History: pain, N/V. FINDINGS: Pancreas: Visualized portions of the head and body of the pancreas are unremarkable. Liver: The liver measures 14.1 cm and demonstrates normal parenchymal echogenicity and echotexture. No evidence of a focal hepatic mass. The main portal vein is patent with antegrade flow. Gallbladder: There is no wall thickening, pericholecystic fluid, positive  sonographic Matthews's sign or evidence for cholelithiasis. Bile Ducts: Both the intra- and extrahepatic biliary system are of normal caliber.  The common bile duct measures 3 mm in diameter. Right Kidney: Measures 9.5 cm in length with normal parenchymal echogenicity and cortical thickness. No hydronephrosis. Fluid: None in the visualized abdomen.     IMPRESSION: Normal abdominal ultrasound. I have personally reviewed the examination and initial interpretation and I agree with the findings. YOSEF LEMA MD   SYSTEM ID:  [id number]    CT Abdomen Pelvis w Contrast    RESIDENT PRELIMINARY INTERPRETATION IMPRESSION: 1.  Pneumoperitoneum. Circumferential distal gastric wall thickening with submucosal edema and enhancement, consistent with gastritis. Along the superior margins of the gastric pylorus there is a questionable focal defect, which may suggest small gastric ulcer. 2.  Trace abdominal ascites. 3.  Colonic diverticulosis without CT evidence of diverticulitis. [Urgent Result: Pneumoperitoneum] Finding was identified on 6/5/2023 5:10 PM. Dr. Ishan AGUDELO was contacted by Dr. Damon at 6/5/2023 5:23 PM and verbalized understanding of the urgent finding.     XR Chest Port 1 View    Result Date: 6/3/2023  Exam: XR CHEST PORT 1 VIEW, 6/3/2023 1:19 PM Indication: r/o sarcoid Comparison: None available Findings: Portable 65 degree upright AP view of the chest. The cardiomediastinal silhouette and pulmonary vasculature are within normal limits. Poor inspiration. No appreciable pleural effusion or pneumothorax. No focal airspace opacity.     Impression: No acute airspace disease. RISSA SHINE DO   SYSTEM ID:  [id number]

## 2023-06-05 NOTE — ED PROVIDER NOTES
[place] EMERGENCY DEPARTMENT ([facility])    6/05/23       ED PROVIDER NOTE        History     Chief Complaint   Patient presents with     Abdominal Pain     The history is provided by the patient, medical records and a relative. The history is limited by a language barrier.     Zeyad Machado is a 67 year old male with a past medical history significant for L abducens nerve palsy who presents to the Emergency Department for evaluation of abdominal pain. Patient is present with his daughter who interprets and provides the story. Patient was recently discharged yesterday after admission to [facility] and felt fine throughout the day. At 1 pm today patient began throwing up constantly. He was sitting by the toilet when he eventually lost consciousness. Patient states he has been feeling epigastric pain, chest pain and shoulder pain. He has not had any type of abdominal surgery in the past. Patient had a temporal artery biopsy done during his prior admission.     Per Chart Review: Patient was admitted to [facility] 6/2-6/4 for management for sub acute L abducens palsy and concern for giant cell arteritis. Patient presents with left temporal tenderness. Patient started on 80 mg prednisone daily until ophthalmology appointment Friday 6/9 due to concern of giant cell arteritis. Ophthalmology and Neurology were consulted. Vascular completed a temporal artery biopsy without complication. He has 3 month history of L abducens nerve palsy.     Past Medical History  No past medical history on file.  No past surgical history on file.  aspirin (ASA) 81 MG chewable tablet  atorvastatin (LIPITOR) 40 MG tablet  insulin glargine (LANTUS PEN) 100 UNIT/ML pen  losartan (COZAAR) 25 MG tablet  metFORMIN (GLUCOPHAGE) 1000 MG tablet  predniSONE (DELTASONE) 20 MG tablet  SENNA-TIME 8.6 MG tablet      No Known Allergies  Family History  No family history on file.  Social History   Social History     Tobacco Use     Smoking status: Every Day      Types: Cigarettes     Smokeless tobacco: Never      Past medical history, past surgical history, medications, allergies, family history, and social history were reviewed with the patient. No additional pertinent items.      A medically appropriate review of systems was performed with pertinent positives and negatives noted in the HPI, and all other systems negative.    Physical Exam   BP: (!) 172/89  Pulse: 93  Temp: 98.3  F (36.8  C)  Resp: 16  SpO2: 98 %  Physical Exam  Vitals and nursing note reviewed.   Constitutional:       General: He is in acute distress.      Appearance: He is ill-appearing.   Cardiovascular:      Rate and Rhythm: Normal rate and regular rhythm.      Heart sounds: Normal heart sounds.   Pulmonary:      Effort: Pulmonary effort is normal.      Breath sounds: Normal breath sounds.   Abdominal:      General: Abdomen is flat.      Palpations: Abdomen is soft.      Tenderness: There is generalized abdominal tenderness and tenderness in the epigastric area.   Skin:     General: Skin is warm.   Neurological:      General: No focal deficit present.      Mental Status: He is alert and oriented to person, place, and time.   Psychiatric:         Mood and Affect: Mood normal.         Behavior: Behavior normal.           ED Course, Procedures, & Data      Procedures             Medications   lactated ringers BOLUS 1,000 mL (0 mLs Intravenous Stopped 6/5/23 1625)     Followed by   lactated ringers infusion (125 mL/hr Intravenous Restarted 6/5/23 1933)   ondansetron (ZOFRAN) injection 4 mg (4 mg Intravenous $Given 6/5/23 1648)   HYDROmorphone (PF) (DILAUDID) injection 0.5 mg (0.5 mg Intravenous $Given 6/5/23 1648)   pharmacy alert - intermittent dosing (has no administration in time range)   HYDROmorphone (PF) (DILAUDID) injection 0.5 mg (0.5 mg Intravenous $Given 6/5/23 1530)   iopamidol (ISOVUE-370) solution 75 mL (75 mLs Intravenous $Given 6/5/23 1702)   sodium chloride (PF) 0.9% PF flush 90 mL (90 mLs  Intravenous $Given 6/5/23 1702)   piperacillin-tazobactam (ZOSYN) 4.5 g vial to attach to  mL bag (0 g Intravenous Stopped 6/5/23 1933)   pantoprazole (PROTONIX) IV push injection 80 mg (80 mg Intravenous $Given 6/5/23 1812)     CT Abdomen Pelvis w Contrast   Preliminary Result   RESIDENT PRELIMINARY INTERPRETATION   IMPRESSION:    1.  Pneumoperitoneum. Circumferential distal gastric wall thickening   with submucosal edema and enhancement, consistent with gastritis.   Along the superior margins of the gastric pylorus there is a   questionable focal defect, which may suggest small gastric ulcer.   2.  Trace abdominal ascites.   3.  Colonic diverticulosis without CT evidence of diverticulitis.      [Urgent Result: Pneumoperitoneum]      Finding was identified on 6/5/2023 5:10 PM.       Dr. Ishan AGUDELO was contacted by Dr. Damon at 6/5/2023 5:23 PM   and verbalized understanding of the urgent finding.       US Abdomen Limited (RUQ)   Final Result   IMPRESSION:    Normal abdominal ultrasound.      I have personally reviewed the examination and initial interpretation   and I agree with the findings.      YOSEF LEMA MD            SYSTEM ID:  [id number]        General surgery resident informed me they do not have OR  capability and recommend transferring to an outside hospital, she told me that her staff is aware.  7:51 PM patient placement has been unable to get a response from pages to two  2 surgeons at [facility].  I request that they contact [facility]'s on-call general surgeon and have them call me     Results for orders placed or performed during the hospital encounter of 06/05/23   US Abdomen Limited (RUQ)     Status: None    Narrative    EXAMINATION: Limited Abdominal Ultrasound, 6/5/2023 5:04 PM     COMPARISON: None.    History: pain, N/V.     FINDINGS:  Pancreas: Visualized portions of the head and body of the pancreas are  unremarkable.     Liver: The liver measures 14.1 cm and demonstrates  normal parenchymal  echogenicity and echotexture. No evidence of a focal hepatic mass. The  main portal vein is patent with antegrade flow.    Gallbladder: There is no wall thickening, pericholecystic fluid,  positive sonographic Matthews's sign or evidence for cholelithiasis.    Bile Ducts: Both the intra- and extrahepatic biliary system are of  normal caliber.  The common bile duct measures 3 mm in diameter.    Right Kidney: Measures 9.5 cm in length with normal parenchymal  echogenicity and cortical thickness. No hydronephrosis.    Fluid: None in the visualized abdomen.      Impression    IMPRESSION:   Normal abdominal ultrasound.    I have personally reviewed the examination and initial interpretation  and I agree with the findings.    YOSEF LEMA MD         SYSTEM ID:  [id number]   CT Abdomen Pelvis w Contrast     Status: None (Preliminary result)    Impression    RESIDENT PRELIMINARY INTERPRETATION  IMPRESSION:   1.  Pneumoperitoneum. Circumferential distal gastric wall thickening  with submucosal edema and enhancement, consistent with gastritis.  Along the superior margins of the gastric pylorus there is a  questionable focal defect, which may suggest small gastric ulcer.  2.  Trace abdominal ascites.  3.  Colonic diverticulosis without CT evidence of diverticulitis.    [Urgent Result: Pneumoperitoneum]    Finding was identified on 6/5/2023 5:10 PM.     Dr. Ishan AGUDELO was contacted by Dr. Damon at 6/5/2023 5:23 PM  and verbalized understanding of the urgent finding.    Comprehensive metabolic panel     Status: Abnormal   Result Value Ref Range    Sodium 133 (L) 136 - 145 mmol/L    Potassium 3.8 3.4 - 5.3 mmol/L    Chloride 97 (L) 98 - 107 mmol/L    Carbon Dioxide (CO2) 20 (L) 22 - 29 mmol/L    Anion Gap 16 (H) 7 - 15 mmol/L    Urea Nitrogen 30.6 (H) 8.0 - 23.0 mg/dL    Creatinine 0.72 0.67 - 1.17 mg/dL    Calcium 9.2 8.8 - 10.2 mg/dL    Glucose 226 (H) 70 - 99 mg/dL    Alkaline Phosphatase 100 40 -  129 U/L    AST 17 10 - 50 U/L    ALT 21 10 - 50 U/L    Protein Total 7.8 6.4 - 8.3 g/dL    Albumin 4.2 3.5 - 5.2 g/dL    Bilirubin Total 0.4 <=1.2 mg/dL    GFR Estimate >90 >60 mL/min/1.73m2   Lipase     Status: Normal   Result Value Ref Range    Lipase 50 13 - 60 U/L   UA with Microscopic reflex to Culture     Status: Abnormal    Specimen: Urine, Clean Catch   Result Value Ref Range    Color Urine Light Yellow Colorless, Straw, Light Yellow, Yellow    Appearance Urine Clear Clear    Glucose Urine 500 (A) Negative mg/dL    Bilirubin Urine Negative Negative    Ketones Urine 10 (A) Negative mg/dL    Specific Gravity Urine 1.017 1.003 - 1.035    Blood Urine Negative Negative    pH Urine 6.0 5.0 - 7.0    Protein Albumin Urine 50 (A) Negative mg/dL    Urobilinogen Urine Normal Normal, 2.0 mg/dL    Nitrite Urine Negative Negative    Leukocyte Esterase Urine Negative Negative    Mucus Urine Present (A) None Seen /LPF    RBC Urine <1 <=2 /HPF    WBC Urine 1 <=5 /HPF    Hyaline Casts Urine 1 <=2 /LPF    Narrative    Urine Culture not indicated   CBC with platelets and differential     Status: Abnormal   Result Value Ref Range    WBC Count 9.8 4.0 - 11.0 10e3/uL    RBC Count 4.78 4.40 - 5.90 10e6/uL    Hemoglobin 12.0 (L) 13.3 - 17.7 g/dL    Hematocrit 37.4 (L) 40.0 - 53.0 %    MCV 78 78 - 100 fL    MCH 25.1 (L) 26.5 - 33.0 pg    MCHC 32.1 31.5 - 36.5 g/dL    RDW 12.7 10.0 - 15.0 %    Platelet Count 467 (H) 150 - 450 10e3/uL    % Neutrophils 83 %    % Lymphocytes 14 %    % Monocytes 3 %    % Eosinophils 0 %    % Basophils 0 %    % Immature Granulocytes 0 %    NRBCs per 100 WBC 0 <1 /100    Absolute Neutrophils 8.1 1.6 - 8.3 10e3/uL    Absolute Lymphocytes 1.4 0.8 - 5.3 10e3/uL    Absolute Monocytes 0.3 0.0 - 1.3 10e3/uL    Absolute Eosinophils 0.0 0.0 - 0.7 10e3/uL    Absolute Basophils 0.0 0.0 - 0.2 10e3/uL    Absolute Immature Granulocytes 0.0 <=0.4 10e3/uL    Absolute NRBCs 0.0 10e3/uL   [place] Draw     Status: None     Narrative    The following orders were created for panel order [place] Draw.  Procedure                               Abnormality         Status                     ---------                               -----------         ------                     Extra Red Top Tube[[id number]]                               Final result                 Please view results for these tests on the individual orders.   Extra Red Top Tube     Status: None   Result Value Ref Range    Hold Specimen JIC    Troponin T, High Sensitivity     Status: Normal   Result Value Ref Range    Troponin T, High Sensitivity 10 <=22 ng/L   EKG 12-lead, tracing only     Status: None (Preliminary result)   Result Value Ref Range    Systolic Blood Pressure  mmHg    Diastolic Blood Pressure  mmHg    Ventricular Rate 88 BPM    Atrial Rate 88 BPM    MO Interval 134 ms    QRS Duration 84 ms     ms    QTc 433 ms    P Axis 59 degrees    R AXIS 31 degrees    T Axis 42 degrees    Interpretation ECG Sinus rhythm  Normal ECG      CBC with platelets differential     Status: Abnormal    Narrative    The following orders were created for panel order CBC with platelets differential.  Procedure                               Abnormality         Status                     ---------                               -----------         ------                     CBC with platelets and d...[[id number]]  Abnormal            Final result                 Please view results for these tests on the individual orders.     Medications   lactated ringers BOLUS 1,000 mL (0 mLs Intravenous Stopped 6/5/23 1625)     Followed by   lactated ringers infusion (125 mL/hr Intravenous Restarted 6/5/23 1933)   ondansetron (ZOFRAN) injection 4 mg (4 mg Intravenous $Given 6/5/23 1648)   HYDROmorphone (PF) (DILAUDID) injection 0.5 mg (0.5 mg Intravenous $Given 6/5/23 1648)   pharmacy alert - intermittent dosing (has no administration in time range)   HYDROmorphone (PF) (DILAUDID) injection 0.5 mg  (0.5 mg Intravenous $Given 6/5/23 1530)   iopamidol (ISOVUE-370) solution 75 mL (75 mLs Intravenous $Given 6/5/23 1702)   sodium chloride (PF) 0.9% PF flush 90 mL (90 mLs Intravenous $Given 6/5/23 1702)   piperacillin-tazobactam (ZOSYN) 4.5 g vial to attach to  mL bag (0 g Intravenous Stopped 6/5/23 1933)   pantoprazole (PROTONIX) IV push injection 80 mg (80 mg Intravenous $Given 6/5/23 1812)     Labs Ordered and Resulted from Time of ED Arrival to Time of ED Departure   COMPREHENSIVE METABOLIC PANEL - Abnormal       Result Value    Sodium 133 (*)     Potassium 3.8      Chloride 97 (*)     Carbon Dioxide (CO2) 20 (*)     Anion Gap 16 (*)     Urea Nitrogen 30.6 (*)     Creatinine 0.72      Calcium 9.2      Glucose 226 (*)     Alkaline Phosphatase 100      AST 17      ALT 21      Protein Total 7.8      Albumin 4.2      Bilirubin Total 0.4      GFR Estimate >90     ROUTINE UA WITH MICROSCOPIC REFLEX TO CULTURE - Abnormal    Color Urine Light Yellow      Appearance Urine Clear      Glucose Urine 500 (*)     Bilirubin Urine Negative      Ketones Urine 10 (*)     Specific Gravity Urine 1.017      Blood Urine Negative      pH Urine 6.0      Protein Albumin Urine 50 (*)     Urobilinogen Urine Normal      Nitrite Urine Negative      Leukocyte Esterase Urine Negative      Mucus Urine Present (*)     RBC Urine <1      WBC Urine 1      Hyaline Casts Urine 1     CBC WITH PLATELETS AND DIFFERENTIAL - Abnormal    WBC Count 9.8      RBC Count 4.78      Hemoglobin 12.0 (*)     Hematocrit 37.4 (*)     MCV 78      MCH 25.1 (*)     MCHC 32.1      RDW 12.7      Platelet Count 467 (*)     % Neutrophils 83      % Lymphocytes 14      % Monocytes 3      % Eosinophils 0      % Basophils 0      % Immature Granulocytes 0      NRBCs per 100 WBC 0      Absolute Neutrophils 8.1      Absolute Lymphocytes 1.4      Absolute Monocytes 0.3      Absolute Eosinophils 0.0      Absolute Basophils 0.0      Absolute Immature Granulocytes 0.0       Absolute NRBCs 0.0     LIPASE - Normal    Lipase 50     TROPONIN T, HIGH SENSITIVITY - Normal    Troponin T, High Sensitivity 10       CT Abdomen Pelvis w Contrast   Preliminary Result   RESIDENT PRELIMINARY INTERPRETATION   IMPRESSION:    1.  Pneumoperitoneum. Circumferential distal gastric wall thickening   with submucosal edema and enhancement, consistent with gastritis.   Along the superior margins of the gastric pylorus there is a   questionable focal defect, which may suggest small gastric ulcer.   2.  Trace abdominal ascites.   3.  Colonic diverticulosis without CT evidence of diverticulitis.      [Urgent Result: Pneumoperitoneum]      Finding was identified on 6/5/2023 5:10 PM.       Dr. Ishan AGUDELO was contacted by Dr. Damon at 6/5/2023 5:23 PM   and verbalized understanding of the urgent finding.       US Abdomen Limited (RUQ)   Final Result   IMPRESSION:    Normal abdominal ultrasound.      I have personally reviewed the examination and initial interpretation   and I agree with the findings.      YOSEF LEMA MD            SYSTEM ID:  [id number]             Critical care was performed.   Critical Care Addendum  My initial assessment, based on my review of nursing observations, review of vital signs, focused history, physical exam, 12 lead ECG analysis and discussion with General surgery resident, established a high suspicion that Zeyad Machado has peritonitis, which requires immediate intervention, and therefore he is critically ill.     After the initial assessment, the care team initiated multiple lab tests, initiated IV fluid administration, initiated medication therapy with Zosyn, Protonix and consulted with General surgery to provide stabilization care. Due to the critical nature of this patient, I reassessed nursing observations, vital signs, physical exam and mental status multiple times prior to his disposition.     Time also spent performing documentation, reviewing test results,  discussion with consultants and coordination of care.     Critical care time (excluding teaching time and procedures): 45 minutes.     Assessment & Plan    67-year-old male  Discharged from the hospital yesterday after he evaluation for possible giant cell arteritis.  He had a vision loss and was started on high-dose steroids he also had a temporal artery biopsy.  Return to the emergency department today complaining of epigastric pain that was rather severe.  CT scan shows free air with inflammation around the distal stomach and the duodenum likely secondary to perforation.  Case discussed with general surgery at 1730 he was given Zosyn and an 80 mg bolus of Protonix.  He is hemodynamically stable.  He is Scottish-speaking his daughter was here earlier but not currently.  Anticipate likely surgical exploration.  He is NPO.    I have reviewed the nursing notes. I have reviewed the findings, diagnosis, plan and need for follow up with the patient.    8:03 PM the patient has been accepted to [facility] by .    New Prescriptions    No medications on file       Final diagnoses:   Acute abdominal pain   Pneumoperitoneum   Perforated viscus   I, MERLIN CALIX, am serving as a trained medical scribe to document services personally performed by Jorge Luis Olmstead MD, based on the provider's statements to me.      IJorge Luis MD, was physically present and have reviewed and verified the accuracy of this note documented by MERLIN CALIX.     Jorge Luis Olmstead MD  [facility] EMERGENCY DEPARTMENT  6/5/2023     Jorge Luis Olmstead MD  06/05/23 2004       Jorge Luis Olmstead MD  06/05/23 9503

## 2023-06-06 PROBLEM — K25.5 PERFORATED GASTRIC ULCER (H): Status: ACTIVE | Noted: 2023-06-06

## 2023-06-06 LAB
ANION GAP SERPL CALCULATED.3IONS-SCNC: 12 MMOL/L (ref 7–15)
BUN SERPL-MCNC: 16 MG/DL (ref 8–23)
CALCIUM SERPL-MCNC: 9.1 MG/DL (ref 8.8–10.2)
CHLORIDE SERPL-SCNC: 102 MMOL/L (ref 98–107)
CREAT SERPL-MCNC: 0.78 MG/DL (ref 0.67–1.17)
DEPRECATED HCO3 PLAS-SCNC: 25 MMOL/L (ref 22–29)
ERYTHROCYTE [DISTWIDTH] IN BLOOD BY AUTOMATED COUNT: 12.4 % (ref 10–15)
GFR SERPL CREATININE-BSD FRML MDRD: >90 ML/MIN/1.73M2
GLUCOSE BLDC GLUCOMTR-MCNC: 128 MG/DL (ref 70–99)
GLUCOSE BLDC GLUCOMTR-MCNC: 129 MG/DL (ref 70–99)
GLUCOSE BLDC GLUCOMTR-MCNC: 175 MG/DL (ref 70–99)
GLUCOSE BLDC GLUCOMTR-MCNC: 237 MG/DL (ref 70–99)
GLUCOSE BLDC GLUCOMTR-MCNC: 244 MG/DL (ref 70–99)
GLUCOSE BLDC GLUCOMTR-MCNC: 256 MG/DL (ref 70–99)
GLUCOSE SERPL-MCNC: 252 MG/DL (ref 70–99)
HCT VFR BLD AUTO: 34.4 % (ref 40–53)
HGB BLD-MCNC: 11.2 G/DL (ref 13.3–17.7)
MCH RBC QN AUTO: 25.4 PG (ref 26.5–33)
MCHC RBC AUTO-ENTMCNC: 32.6 G/DL (ref 31.5–36.5)
MCV RBC AUTO: 78 FL (ref 78–100)
PATH REPORT.COMMENTS IMP SPEC: NORMAL
PATH REPORT.FINAL DX SPEC: NORMAL
PATH REPORT.GROSS SPEC: NORMAL
PATH REPORT.MICROSCOPIC SPEC OTHER STN: NORMAL
PATH REPORT.MICROSCOPIC SPEC OTHER STN: NORMAL
PATH REPORT.RELEVANT HX SPEC: NORMAL
PHOTO IMAGE: NORMAL
PLATELET # BLD AUTO: 367 10E3/UL (ref 150–450)
POTASSIUM SERPL-SCNC: 4.5 MMOL/L (ref 3.4–5.3)
RBC # BLD AUTO: 4.41 10E6/UL (ref 4.4–5.9)
SODIUM SERPL-SCNC: 139 MMOL/L (ref 136–145)
WBC # BLD AUTO: 15.6 10E3/UL (ref 4–11)

## 2023-06-06 PROCEDURE — 250N000011 HC RX IP 250 OP 636: Performed by: PHYSICIAN ASSISTANT

## 2023-06-06 PROCEDURE — C9113 INJ PANTOPRAZOLE SODIUM, VIA: HCPCS | Performed by: PHYSICIAN ASSISTANT

## 2023-06-06 PROCEDURE — 258N000003 HC RX IP 258 OP 636: Performed by: PHYSICIAN ASSISTANT

## 2023-06-06 PROCEDURE — 250N000012 HC RX MED GY IP 250 OP 636 PS 637: Performed by: PHYSICIAN ASSISTANT

## 2023-06-06 PROCEDURE — 36415 COLL VENOUS BLD VENIPUNCTURE: CPT | Performed by: PHYSICIAN ASSISTANT

## 2023-06-06 PROCEDURE — 82310 ASSAY OF CALCIUM: CPT | Performed by: PHYSICIAN ASSISTANT

## 2023-06-06 PROCEDURE — 99232 SBSQ HOSP IP/OBS MODERATE 35: CPT

## 2023-06-06 PROCEDURE — 120N000001 HC R&B MED SURG/OB

## 2023-06-06 PROCEDURE — 85014 HEMATOCRIT: CPT | Performed by: PHYSICIAN ASSISTANT

## 2023-06-06 RX ORDER — NICOTINE POLACRILEX 4 MG
15-30 LOZENGE BUCCAL
Status: DISCONTINUED | OUTPATIENT
Start: 2023-06-06 | End: 2023-06-09 | Stop reason: HOSPADM

## 2023-06-06 RX ORDER — HYDROMORPHONE HCL IN WATER/PF 6 MG/30 ML
0.2 PATIENT CONTROLLED ANALGESIA SYRINGE INTRAVENOUS
Status: DISCONTINUED | OUTPATIENT
Start: 2023-06-06 | End: 2023-06-09 | Stop reason: HOSPADM

## 2023-06-06 RX ORDER — HEPARIN SODIUM 5000 [USP'U]/.5ML
5000 INJECTION, SOLUTION INTRAVENOUS; SUBCUTANEOUS EVERY 8 HOURS
Status: DISCONTINUED | OUTPATIENT
Start: 2023-06-06 | End: 2023-06-09 | Stop reason: HOSPADM

## 2023-06-06 RX ORDER — OXYCODONE HYDROCHLORIDE 5 MG/1
5 TABLET ORAL EVERY 4 HOURS PRN
Status: DISCONTINUED | OUTPATIENT
Start: 2023-06-06 | End: 2023-06-09 | Stop reason: HOSPADM

## 2023-06-06 RX ORDER — NALOXONE HYDROCHLORIDE 0.4 MG/ML
0.2 INJECTION, SOLUTION INTRAMUSCULAR; INTRAVENOUS; SUBCUTANEOUS
Status: DISCONTINUED | OUTPATIENT
Start: 2023-06-06 | End: 2023-06-09 | Stop reason: HOSPADM

## 2023-06-06 RX ORDER — PANTOPRAZOLE SODIUM 40 MG/10ML
40 INJECTION, POWDER, LYOPHILIZED, FOR SOLUTION INTRAVENOUS EVERY 12 HOURS
Status: DISCONTINUED | OUTPATIENT
Start: 2023-06-06 | End: 2023-06-09 | Stop reason: HOSPADM

## 2023-06-06 RX ORDER — NALOXONE HYDROCHLORIDE 0.4 MG/ML
0.4 INJECTION, SOLUTION INTRAMUSCULAR; INTRAVENOUS; SUBCUTANEOUS
Status: DISCONTINUED | OUTPATIENT
Start: 2023-06-06 | End: 2023-06-09 | Stop reason: HOSPADM

## 2023-06-06 RX ORDER — DEXTROSE MONOHYDRATE 25 G/50ML
25-50 INJECTION, SOLUTION INTRAVENOUS
Status: DISCONTINUED | OUTPATIENT
Start: 2023-06-06 | End: 2023-06-09 | Stop reason: HOSPADM

## 2023-06-06 RX ORDER — LIDOCAINE 40 MG/G
CREAM TOPICAL
Status: DISCONTINUED | OUTPATIENT
Start: 2023-06-06 | End: 2023-06-09 | Stop reason: HOSPADM

## 2023-06-06 RX ORDER — HYDROMORPHONE HCL IN WATER/PF 6 MG/30 ML
0.4 PATIENT CONTROLLED ANALGESIA SYRINGE INTRAVENOUS
Status: DISCONTINUED | OUTPATIENT
Start: 2023-06-06 | End: 2023-06-09 | Stop reason: HOSPADM

## 2023-06-06 RX ORDER — ONDANSETRON 4 MG/1
4 TABLET, ORALLY DISINTEGRATING ORAL EVERY 6 HOURS PRN
Status: DISCONTINUED | OUTPATIENT
Start: 2023-06-06 | End: 2023-06-09 | Stop reason: HOSPADM

## 2023-06-06 RX ORDER — PROCHLORPERAZINE MALEATE 5 MG
5 TABLET ORAL EVERY 6 HOURS PRN
Status: DISCONTINUED | OUTPATIENT
Start: 2023-06-06 | End: 2023-06-09 | Stop reason: HOSPADM

## 2023-06-06 RX ORDER — DIPHENHYDRAMINE HCL 12.5MG/5ML
12.5 LIQUID (ML) ORAL EVERY 6 HOURS PRN
Status: DISCONTINUED | OUTPATIENT
Start: 2023-06-06 | End: 2023-06-09 | Stop reason: HOSPADM

## 2023-06-06 RX ORDER — DIPHENHYDRAMINE HYDROCHLORIDE 50 MG/ML
12.5 INJECTION INTRAMUSCULAR; INTRAVENOUS EVERY 6 HOURS PRN
Status: DISCONTINUED | OUTPATIENT
Start: 2023-06-06 | End: 2023-06-09 | Stop reason: HOSPADM

## 2023-06-06 RX ORDER — PIPERACILLIN SODIUM, TAZOBACTAM SODIUM 3; .375 G/15ML; G/15ML
3.38 INJECTION, POWDER, LYOPHILIZED, FOR SOLUTION INTRAVENOUS EVERY 6 HOURS
Status: DISCONTINUED | OUTPATIENT
Start: 2023-06-06 | End: 2023-06-09 | Stop reason: HOSPADM

## 2023-06-06 RX ORDER — ONDANSETRON 2 MG/ML
4 INJECTION INTRAMUSCULAR; INTRAVENOUS EVERY 6 HOURS PRN
Status: DISCONTINUED | OUTPATIENT
Start: 2023-06-06 | End: 2023-06-09 | Stop reason: HOSPADM

## 2023-06-06 RX ORDER — OXYCODONE HYDROCHLORIDE 5 MG/1
10 TABLET ORAL EVERY 4 HOURS PRN
Status: DISCONTINUED | OUTPATIENT
Start: 2023-06-06 | End: 2023-06-09 | Stop reason: HOSPADM

## 2023-06-06 RX ORDER — ACETAMINOPHEN 650 MG/1
650 SUPPOSITORY RECTAL EVERY 4 HOURS PRN
Status: DISCONTINUED | OUTPATIENT
Start: 2023-06-06 | End: 2023-06-09 | Stop reason: HOSPADM

## 2023-06-06 RX ORDER — SODIUM CHLORIDE 9 MG/ML
INJECTION, SOLUTION INTRAVENOUS CONTINUOUS
Status: DISCONTINUED | OUTPATIENT
Start: 2023-06-06 | End: 2023-06-07

## 2023-06-06 RX ADMIN — PANTOPRAZOLE SODIUM 40 MG: 40 INJECTION, POWDER, FOR SOLUTION INTRAVENOUS at 01:05

## 2023-06-06 RX ADMIN — PIPERACILLIN AND TAZOBACTAM 3.38 G: 3; .375 INJECTION, POWDER, FOR SOLUTION INTRAVENOUS at 17:58

## 2023-06-06 RX ADMIN — HYDROMORPHONE HYDROCHLORIDE 0.4 MG: 0.2 INJECTION, SOLUTION INTRAMUSCULAR; INTRAVENOUS; SUBCUTANEOUS at 17:59

## 2023-06-06 RX ADMIN — INSULIN ASPART 1 UNITS: 100 INJECTION, SOLUTION INTRAVENOUS; SUBCUTANEOUS at 13:01

## 2023-06-06 RX ADMIN — INSULIN ASPART 3 UNITS: 100 INJECTION, SOLUTION INTRAVENOUS; SUBCUTANEOUS at 05:47

## 2023-06-06 RX ADMIN — INSULIN ASPART 3 UNITS: 100 INJECTION, SOLUTION INTRAVENOUS; SUBCUTANEOUS at 01:54

## 2023-06-06 RX ADMIN — SODIUM CHLORIDE: 9 INJECTION, SOLUTION INTRAVENOUS at 13:02

## 2023-06-06 RX ADMIN — HEPARIN SODIUM 5000 UNITS: 5000 INJECTION, SOLUTION INTRAVENOUS; SUBCUTANEOUS at 17:59

## 2023-06-06 RX ADMIN — PIPERACILLIN AND TAZOBACTAM 3.38 G: 3; .375 INJECTION, POWDER, FOR SOLUTION INTRAVENOUS at 06:00

## 2023-06-06 RX ADMIN — PANTOPRAZOLE SODIUM 40 MG: 40 INJECTION, POWDER, FOR SOLUTION INTRAVENOUS at 13:00

## 2023-06-06 RX ADMIN — HYDROMORPHONE HYDROCHLORIDE 0.4 MG: 0.2 INJECTION, SOLUTION INTRAMUSCULAR; INTRAVENOUS; SUBCUTANEOUS at 20:38

## 2023-06-06 RX ADMIN — HYDROMORPHONE HYDROCHLORIDE 0.2 MG: 0.2 INJECTION, SOLUTION INTRAMUSCULAR; INTRAVENOUS; SUBCUTANEOUS at 07:55

## 2023-06-06 RX ADMIN — SODIUM CHLORIDE: 9 INJECTION, SOLUTION INTRAVENOUS at 00:44

## 2023-06-06 RX ADMIN — PIPERACILLIN AND TAZOBACTAM 3.38 G: 3; .375 INJECTION, POWDER, FOR SOLUTION INTRAVENOUS at 11:26

## 2023-06-06 RX ADMIN — HYDROMORPHONE HYDROCHLORIDE 0.4 MG: 0.2 INJECTION, SOLUTION INTRAMUSCULAR; INTRAVENOUS; SUBCUTANEOUS at 15:20

## 2023-06-06 RX ADMIN — INSULIN ASPART 2 UNITS: 100 INJECTION, SOLUTION INTRAVENOUS; SUBCUTANEOUS at 08:55

## 2023-06-06 RX ADMIN — PIPERACILLIN AND TAZOBACTAM 3.38 G: 3; .375 INJECTION, POWDER, FOR SOLUTION INTRAVENOUS at 01:05

## 2023-06-06 RX ADMIN — HYDROMORPHONE HYDROCHLORIDE 0.4 MG: 0.2 INJECTION, SOLUTION INTRAMUSCULAR; INTRAVENOUS; SUBCUTANEOUS at 11:29

## 2023-06-06 ASSESSMENT — ACTIVITIES OF DAILY LIVING (ADL)
ADLS_ACUITY_SCORE: 35

## 2023-06-06 NOTE — OR NURSING
NIA Sanchez now rounding on Pt, Pt awake; sleeping on/off. VS & surgical sites remain stable. FSG = 204, no new orders given.  Okay for Pt to transfer out of PACU per [facility].

## 2023-06-06 NOTE — ED NOTES
Transfer to [facility] pacu discussed with pt and family at bedside. EMTALA  form signed by pt.[facility] ambulance services called.

## 2023-06-06 NOTE — ANESTHESIA CARE TRANSFER NOTE
Patient: Zeyad Machado    Procedure: Procedure(s):  LAPAROSCOPIC ABDOMINAL EXPLORATION, REPAIR OF PERFORATED GASTRIC ULCER, WITH FALCIFORM LIGAMENT PATCH       Diagnosis: Perforated bowel (H) [K63.1]  Diagnosis Additional Information: No value filed.    Anesthesia Type:   General     Note:    Oropharynx: oropharynx clear of all foreign objects and spontaneously breathing  Level of Consciousness: awake  Oxygen Supplementation: face mask  Level of Supplemental Oxygen (L/min / FiO2): 8  Independent Airway: airway patency satisfactory and stable  Dentition: dentition unchanged  Vital Signs Stable: post-procedure vital signs reviewed and stable  Report to RN Given: handoff report given  Patient transferred to: PACU    Handoff Report: Identifed the Patient, Identified the Reponsible Provider, Reviewed the pertinent medical history, Discussed the surgical course, Reviewed Intra-OP anesthesia mangement and issues during anesthesia, Set expectations for post-procedure period and Allowed opportunity for questions and acknowledgement of understanding      Vitals:  Vitals Value Taken Time   /79 06/05/23 2305   Temp     Pulse 89 06/05/23 2309   Resp 15 06/05/23 2309   SpO2 100 % 06/05/23 2309   Vitals shown include unvalidated device data.    Electronically Signed By: BETTIE Johnson CRNA  June 5, 2023  11:11 PM

## 2023-06-06 NOTE — ANESTHESIA POSTPROCEDURE EVALUATION
Patient: Zeyad Machado    Procedure: Procedure(s):  LAPAROSCOPIC ABDOMINAL EXPLORATION, REPAIR OF PERFORATED GASTRIC ULCER, WITH FALCIFORM LIGAMENT PATCH       Anesthesia Type:  General    Note:     Postop Pain Control: Uneventful            Sign Out: Well controlled pain   PONV: No   Neuro/Psych: Uneventful            Sign Out: Acceptable/Baseline neuro status   Airway/Respiratory: Uneventful            Sign Out: Acceptable/Baseline resp. status   CV/Hemodynamics: Uneventful            Sign Out: Acceptable CV status; No obvious hypovolemia; No obvious fluid overload   Other NRE: NONE   DID A NON-ROUTINE EVENT OCCUR? No           Last vitals:  Vitals Value Taken Time   /76 06/05/23 2350   Temp 36.4  C (97.5  F) 06/05/23 2310   Pulse 93 06/05/23 2354   Resp 16 06/05/23 2354   SpO2 100 % 06/05/23 2354   Vitals shown include unvalidated device data.    Electronically Signed By: Nomi Sanchez MD  June 5, 2023  11:56 PM

## 2023-06-06 NOTE — ANESTHESIA PREPROCEDURE EVALUATION
Anesthesia Pre-Procedure Evaluation    Patient: Zeyad Machado   MRN: [id number] : 1955        Procedure : Procedure(s):  LAPAROSCOPIC ABDOMINAL EXPLORATION  POSSIBLE OPEN          No past medical history on file.   No past surgical history on file.   No Known Allergies   Social History     Tobacco Use     Smoking status: Every Day     Types: Cigarettes     Smokeless tobacco: Never   Vaping Use     Vaping status: Not on file   Substance Use Topics     Alcohol use: Not on file      Wt Readings from Last 1 Encounters:   23 68.9 kg (152 lb)        Head CT  CT HEAD W/O CONTRAST 2023 4:49 PM     Provided History: CN IV palsy, papilledema     Comparison: None.     Technique: Using multidetector thin collimation helical acquisition  technique, axial, coronal and sagittal CT images from the skull base  to the vertex were obtained without intravenous contrast.      Findings:    No intracranial hemorrhage. No mass effect. No midline shift. No  extra-axial fluid collection. The gray to white matter differentiation  of the cerebral hemispheres is preserved. Ventricles are proportionate  to the sulci. No sulcal effacement. The basal cisterns are patent.     The visualized paranasal sinuses are relatively clear. The mastoid air  cells are hypoplastic, but clear. Orbits appear unremarkable. No acute  fracture.                                                                      Impression: No acute intracranial pathology.    CT abdomen  RESIDENT PRELIMINARY INTERPRETATION  IMPRESSION:   1.  Pneumoperitoneum. Circumferential distal gastric wall thickening  with submucosal edema and enhancement, consistent with gastritis.  Along the superior margins of the gastric pylorus there is a  questionable focal defect, which may suggest small gastric ulcer.  2.  Trace abdominal ascites.  3.  Colonic diverticulosis without CT evidence of diverticulitis.    EKG  Sinus rhythm   Normal ECG   Unconfirmed report - interpretation of  this ECG is computer generated - see medical record for final interpretation         Anesthesia Evaluation   Pt has had prior anesthetic.     No history of anesthetic complications       ROS/MED HX  ENT/Pulmonary:     (+) tobacco use,  (-) sleep apnea and recent URI   Neurologic: Comment: Admitted to [facility] 6/2 - 6/4 for left subacute abducens nerve palsy and concern for giant cell arteritis, started on high does prednisone.     Left abducens nerve palsy x 3 months  Double vision    (+) no peripheral neuropathy  (-) no seizures, no CVA and migraines   Cardiovascular:     (+) Dyslipidemia hypertension----- (-) CHF, orthopnea/PND and arrhythmias   METS/Exercise Tolerance:     Hematologic:  - neg hematologic  ROS     Musculoskeletal:  - neg musculoskeletal ROS     GI/Hepatic: Comment: Sitting on bed because of abdominal pain had LOC( fell backwards), patient denies hitting head    Abdominal pain/ chest pain/ shoulder pain   (-) GERD   Renal/Genitourinary:  - neg Renal ROS     Endo: Comment: Spilling glucose in urine    (+) type II DM, Last HgA1c: 8.1, Using insulin,     Psychiatric/Substance Use:  - neg psychiatric ROS     Infectious Disease:  - neg infectious disease ROS     Malignancy:  - neg malignancy ROS     Other:            Physical Exam    Airway  airway exam normal      Mallampati: II   TM distance: > 3 FB   Neck ROM: full   Mouth opening: > 3 cm    Respiratory Devices and Support         Dental       (+) Minor Abnormalities - some fillings, tiny chips      Cardiovascular   cardiovascular exam normal       Rhythm and rate: regular and normal     Pulmonary   pulmonary exam normal        breath sounds clear to auscultation           OUTSIDE LABS:  CBC:   Lab Results   Component Value Date    WBC 9.8 06/05/2023    WBC 15.8 (H) 06/04/2023    HGB 12.0 (L) 06/05/2023    HGB 10.6 (L) 06/04/2023    HCT 37.4 (L) 06/05/2023    HCT 32.8 (L) 06/04/2023     (H) 06/05/2023     06/04/2023     BMP:   Lab  Results   Component Value Date     (L) 06/05/2023     06/04/2023    POTASSIUM 3.8 06/05/2023    POTASSIUM 4.0 06/04/2023    CHLORIDE 97 (L) 06/05/2023    CHLORIDE 106 06/04/2023    CO2 20 (L) 06/05/2023    CO2 20 (L) 06/04/2023    BUN 30.6 (H) 06/05/2023    BUN 21.4 06/04/2023    CR 0.72 06/05/2023    CR 0.67 06/04/2023     (H) 06/05/2023     (H) 06/04/2023     COAGS: No results found for: PTT, INR, FIBR  POC: No results found for: BGM, HCG, HCGS  HEPATIC:   Lab Results   Component Value Date    ALBUMIN 4.2 06/05/2023    PROTTOTAL 7.8 06/05/2023    ALT 21 06/05/2023    AST 17 06/05/2023    ALKPHOS 100 06/05/2023    BILITOTAL 0.4 06/05/2023     OTHER:   Lab Results   Component Value Date    A1C 8.1 (H) 06/02/2023    LULU 9.2 06/05/2023    LIPASE 50 06/05/2023    SED 78 (H) 06/03/2023       Anesthesia Plan    ASA Status:  2, emergent       Anesthesia Type: General.   Induction: RSI, Propofol.   Maintenance: Balanced.   Techniques and Equipment:     - Airway: Video-Laryngoscope         Consents    Anesthesia Plan(s) and associated risks, benefits, and realistic alternatives discussed. Questions answered and patient/representative(s) expressed understanding.    - Discussed:     - Discussed with:  Patient         Postoperative Care    Pain management: IV analgesics.   PONV prophylaxis: Ondansetron (or other 5HT-3)     Comments:    Other Comments: Belgian speaking.  History/ review of systems done with OR staff/ surgeon who are fluent in Belgian.             Nomi Sanchez MD

## 2023-06-06 NOTE — PLAN OF CARE
Goal Outcome Evaluation:    Pt POD1 of a Laparascopic abdominal exploration, repair of perforated gastric ulcer w/ falciform ligament patch. A&Ox4, English speaking but able to answer assessment questions and express needs. VSS on RA except slightly hypertensive. NPO w/ no exceptions. Dangled at side of bed w/ nursing, up SBA. Voiding adequately in urinal. 2 L abdominal lap sites and R abdominal SHANELLE drain site CDI. SHANELLE draining serosanguinous output. CMS intact. Denies pain. NG tube patent & hooked to int low suction w/ minimal drainage. R PIV infusing NS @100cc/hr. Tele NSR. Continue plan of care.

## 2023-06-06 NOTE — PLAN OF CARE
"Goal Outcome Evaluation:      Plan of Care Reviewed With: patient    Overall Patient Progress: improvingOverall Patient Progress: improving    A&OX4, Polish speaking, using family and  line on Vocera as needed for interpretation needs. VSS.  Pain controlled with IV dilaudid.  Strict NPO.  NG to LIS draining brown/green output.  Positive BS, no flatus, no nausea.  Lap sites CD&I with steristrips/bandaids. ABD binder on.  Lungs clear, encouraging IS use.  Up ambulating in halls 2 times so far this shift with SBA of 1, gait belt and walker.  Voiding well. Baseline \"double vision\" since admission here, has patch to wear as needed.            "

## 2023-06-06 NOTE — PHARMACY-ADMISSION MEDICATION HISTORY
Pharmacist Admission Medication History    Admission medication history is complete. The information provided in this note is only as accurate as the sources available at the time of the update.    Medication reconciliation/reorder completed by provider prior to medication history? No    Information Source(s): Hospital records and CareEverywhere/SureScripts via N/A    Pertinent Information:     Started prednisone 80 mg daily 6/4, initial plan to continue until appointment on 6/9    Recent Lantus initiation 6/5    Changes made to PTA medication list:    Added: None    Deleted: None    Changed: None    Medication Affordability:  Not including over the counter (OTC) medications, was there a time in the past 3 months when you did not take your medications as prescribed because of cost?: Unable to Assess    Allergies reviewed with patient and updates made in EHR: unable to assess    Medication History Completed By: BRE REVELES [facility] 6/6/2023 7:44 AM    Prior to Admission medications    Medication Sig Last Dose Taking? Auth Provider Long Term End Date   aspirin (ASA) 81 MG chewable tablet Take 1 tablet (81 mg) by mouth daily  Yes Severson, Hayley, MD     atorvastatin (LIPITOR) 40 MG tablet TOME CÉSAR TABLETA POR LA BOCA CADA NOCHE  Yes Reported, Patient Yes    insulin glargine (LANTUS PEN) 100 UNIT/ML pen Inject 10 Units Subcutaneous At Bedtime  Yes Severson, Hayley, MD Yes    losartan (COZAAR) 25 MG tablet TOME CÉSAR TABLETA POR LA BOCA CADA NAREN  Yes Reported, Patient Yes    metFORMIN (GLUCOPHAGE) 1000 MG tablet Take 1 tablet (1,000 mg) by mouth 2 times daily (with meals)  Yes Severson, Hayley, MD Yes    predniSONE (DELTASONE) 20 MG tablet Take 4 tablets (80 mg) by mouth daily  Yes Severson, Hayley, MD     SENNA-TIME 8.6 MG tablet TAKE ONE TABLET BY MOUTH AS DIRECTED 1-2 veces EVERY DAY AS NEEDED para estrenimiento  Yes Reported, Patient

## 2023-06-06 NOTE — CONSULTS
[facility]  Consult Note - Hospitalist Service  Date of Admission:  6/5/2023  Consult Requested by: Dr. Mercedes  Reason for Consult: medical management, strict NPO, no oral meds    Assessment & Plan   Zeyad Machado is a 67 year old male with a PMH significant for DM type II, hyperlipidemia, hypertension, and recent hospitalization for facial palsy thought to be related to giant cell arteritis and was discharged 6/2-6/4 who admitted on 6/5/2023 with abdominal pain found to have pneumoperitoneum on CT.     Pneumoperitoneum  Patient initially presented with abdominal pain.  Patient was taking for emergent exploratory laparotomy and perforated gastric ulcer repair.  Patient has NG tube to LIS.  Patient endorses some nausea, and abdominal pain.  -Defer postoperative management including anticoagulation, analgesia, IV fluids, activity, PT OT, and diet to primary surgical team  - Continue Zosyn    Cranial nerve VI facial palsy   Patient presented to the ED 6/2/23 with diplopia and left temple pain secondary to left cranial nerve VI palsy thought to be due to giant cell arteritis.  Patient was evaluated by ophthalmology.  Patient had MRI brain MRA of head and neck in April when symptoms started without acute pathology however there was evidence of intracranial atherosclerosis.  Left temporal biopsy shows no evidence of granulomatous inflammation, chronic inflammation, or giant cells or necrosis.  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 Patient denies left temporal pain or diplopia at time of evaluation.   - Patient was treated with IV methylprednisolone 6/3 and transitioned to 80 mg prednisone 6/4. Low suspicion for adrenal insufficiency. Received 4mg dexamethasone intraoperatively. Given biopsy negative for GCA, will not resume prednisone. Consider resuming steroid if patient becomes hypotensive.   - Follow closely with  "opthalmology at discharge  - Continue to follow with PCP for ongoing management    Type II DM, uncontrolled  HgbA1c 6/2/23 8.1.PTA regimen includes 10 mg Lantus at bedtime, metformin 1000 mg twice daily.  -Glucose checks q4h while NPO  -Medium insulin resistant sliding scale   -We will hold Lantus in the setting of patient being NPO  -Can resume PTA Lantus and metformin at discharge    Hyperlipidemia  Hypertension  Okay to hold PTA atorvastatin in the setting of patient being strict n.p.o.     The patient's care was discussed with the Attending Physician, Dr. Butler.    Clinically Significant Risk Factors Present on Admission                # Drug Induced Platelet Defect: home medication list includes an antiplatelet medication   # Hypertension: Home medication list includes antihypertensive(s)     # DMII: A1C = 8.1 % (Ref range: <5.7 %) within past 6 months    # Overweight: Estimated body mass index is 25.29 kg/m  as calculated from the following:    Height as of 6/2/23: 1.651 m (5' 5\").    Weight as of 6/2/23: 68.9 kg (152 lb).            Josh Louis NP  Hospitalist Service  Securely message with ttwick (more info)  Text page via [facility] Paging/Directory   ______________________________________________________________________    Chief Complaint   Abdominal Pain    History is obtained from the patient and chart review.    History of Present Illness   Zeyad Machado is a 67 year old male with a PMH significant for DM type II, hyperlipidemia, hypertension, and recent hospitalization for facial palsy thought to be related to giant cell arteritis and was discharged 6/2-6/4 who admitted on 6/5/2023 with abdominal pain found to have pneumoperitoneum on CT.     Patient underwent emergent laprarscopic abdominal exploration with perforated gastric ulcer repair with Dr. Mercedes 6/6/23 performed under general anesthesia. No intraoperative complications. EBL <10 mL.     Upon evaluation patient is A/O. Non-toxic appearing. A "  was used during evaluation. Patient denies fevers, chills, headache, left temporal pain, diplopia, lightheadedness, dizziness, or vomiting. He does endorse intermittent nausea.         Past Medical History    No past medical history on file.    Past Surgical History   Past Surgical History:   Procedure Laterality Date     LAPAROSCOPY DIAGNOSTIC (GENERAL) N/A 6/5/2023    Procedure: LAPAROSCOPIC ABDOMINAL EXPLORATION, REPAIR OF PERFORATED GASTRIC ULCER, WITH FALCIFORM LIGAMENT PATCH;  Surgeon: Kevin Mercedes MD;  Location:  OR       Medications   Medications Prior to Admission   Medication Sig Dispense Refill Last Dose     aspirin (ASA) 81 MG chewable tablet Take 1 tablet (81 mg) by mouth daily 30 tablet 0      atorvastatin (LIPITOR) 40 MG tablet TOME CÉSAR TABLETA POR LA BOCA CADA NOCHE        insulin glargine (LANTUS PEN) 100 UNIT/ML pen Inject 10 Units Subcutaneous At Bedtime 15 mL 0      losartan (COZAAR) 25 MG tablet TOME CÉSAR TABLETA POR LA BOCA CADA NAREN        metFORMIN (GLUCOPHAGE) 1000 MG tablet Take 1 tablet (1,000 mg) by mouth 2 times daily (with meals) 60 tablet 0      predniSONE (DELTASONE) 20 MG tablet Take 4 tablets (80 mg) by mouth daily 120 tablet 0      SENNA-TIME 8.6 MG tablet TAKE ONE TABLET BY MOUTH AS DIRECTED 1-2 veces EVERY DAY AS NEEDED para estrenimiento             Social History   I have reviewed this patient's social history and updated it with pertinent information if needed.  Social History     Tobacco Use     Smoking status: Every Day     Types: Cigarettes     Smokeless tobacco: Never         Allergies   No Known Allergies     Physical Exam   Vital Signs: Temp: 98.4  F (36.9  C) Temp src: Axillary BP: (!) 148/88 Pulse: 92   Resp: 16 SpO2: 97 % O2 Device: None (Room air) Oxygen Delivery: 1 LPM  Weight: 0 lbs 0 oz    EXAM:   Nursing Notes Reviewed.  General:  Appears stated age, no acute distress. A&O x 3.  Skin:  Warm, dry. Abdominal lap sites clean, dry, intact.  Right SHANELLE drain has serosanguinous cloudy drainage.   HEENT:  Normocephalic, atraumatic.  Neck:  Supple.  Chest:  Breath sounds CTA and no increased work of breathing on room air.  Cardiovascular:  RRR, no rub or murmur. No peripheral edema.  Abdomen:  Soft, tender, distended. No audible bowel sounds. NGT to LIS with green bile.  Musculoskeletal:  Moves all four extremities. No muscle atrophy.  Neurological:  No focal neurological deficits.  Psychiatric:  Affect and mood congruent.      Medical Decision Making       45 MINUTES SPENT BY ME on the date of service doing chart review, history, exam, documentation & further activities per the note.      Data     I have personally reviewed the following data over the past 24 hrs:    15.6 (H)  \   11.2 (L)   / 367     139 102 16.0 /  175 (H)   4.5 25 0.78 \       Imaging results reviewed over the past 24 hrs:   No results found for this or any previous visit (from the past 24 hour(s)).

## 2023-06-06 NOTE — BRIEF OP NOTE
[facility]  General Surgery Brief Operative Note    Pre-operative diagnosis: Perforated bowel (H) [K63.1]   Post-operative diagnosis Perforated gastric ulcer   Procedure: Procedure(s):  LAPAROSCOPIC ABDOMINAL EXPLORATION, REPAIR OF PERFORATED GASTRIC ULCER, WITH FALCIFORM LIGAMENT PATCH    Surgeon(s), Assistant(s): Surgeon(s) and Role:     * Kevin Mercedes MD - Primary     * Kyung Minaya PA-C - Assisting   Estimated blood loss: Minimal <10mL   Drains: Noe-Mae   Specimens: * No specimens in log *   Findings: perforated gastric ulcer, localized peritonitis..   Complications:  Condition: None  Stable   Comments:      Kyung Minaya PA-C  [facility]         See dictated operative report for full details

## 2023-06-06 NOTE — PROGRESS NOTES
[facility]  GENERAL SURGERY Progress Note    Admission Date: 2023         Assessment and Plan:     Zeyad Machado is a 67 year old male who was recently hospitalized for giant cell arteritis (discharged ), presented to [facility]  for acute abdominal pain.  His CT showed pneumoperitoneum suspicious for perforated gastric ulcer.  He was transferred here from [facility] due to OR capacity.  He underwent LAPAROSCOPIC ABDOMINAL EXPLORATION, REPAIR OF PERFORATED GASTRIC ULCER, WITH FALCIFORM LIGAMENT PATCH, 1 Day Post-Op.  - Strict NPO, NGT (do not manipulate or irrigate), await ROBF prior to UGI    - Pain control- Dilaudid IV PRN  - WBC 15.6, continue Zosyn, CBC Thursday  - Continue SHANELLE  - Hgb 11.2, start Heparin for DVT prophylaxis later today  - Continue Protonix bid  - Labs ok  - Ambulate 4x day and encourage IS  - Hospitalist consult pending             Interval History:     Patient seen via  services. Pain controlled, -flatus/-BM, UO adequate, not up much yet.                      Physical Exam:   Blood pressure (!) 148/88, pulse 92, temperature 98.4  F (36.9  C), temperature source Axillary, resp. rate 16, SpO2 97 %.  Temperature Temp  Av.9  F (36.6  C)  Min: 97.3  F (36.3  C)  Max: 98.4  F (36.9  C)   I/O last 3 completed shifts:  In: 1400 [I.V.:1400]  Out:  [Urine:2000; Drains:95]  Constitutional:  Awake and in no apparent distress.   Lungs: No increased work of breathing.   Cardiovascular: Regular rate and rhythm.   Abdomen: Soft, non-distended, appropriately tender at incision(s). SHANELLE drain intact, serosanguinous but somewhat cloudy.   Wound(s): Clean, dry, and intact. No erythema or drainage.    Extremities: No edema or calf tenderness. +SCDs          Data:     Recent Labs   Lab Test 23  0651 23  1514 23  0722   WBC 15.6* 9.8 15.8*   HGB 11.2* 12.0* 10.6*   HCT 34.4* 37.4* 32.8*    467* 379      Recent Labs   Lab Test 23  0651  06/05/23  1515 06/04/23  0722    133* 139   POTASSIUM 4.5 3.8 4.0   CHLORIDE 102 97* 106   CO2 25 20* 20*   BUN 16.0 30.6* 21.4   CR 0.78 0.72 0.67     Glucose 237    Kyung Minaya PA-C  [facility]  [phone]

## 2023-06-06 NOTE — TELEPHONE ENCOUNTER
This pt was admitted yesterday and looks like he had surgery as well.     Did you want to see him?    Thank You,     Silvia

## 2023-06-06 NOTE — OP NOTE
Preoperative diagnosis: Perforated peptic ulcer disease.   Postoperative diagnosis: Perforated gastric ulcer, prepyloric.    Operative procedure:   1. Laparoscopic repair of perforated prepyloric gastric ulcer   2.  Falciform ligament patch.    3.  Drain placement after peritoneal lavage.    Surgeon: Kevin Mercedes M.D.   Assistant:Kyung Minaya PA-C, The physicians assistant was medically necessary for their expertise in camera management, suctioning, suturing, and retraction.    Anesthesia: GETA   EBL: Less than 10 mL.   Events:   After induction of general endotracheal anesthesia Zeyad Machado abdomen was prepped and draped in the usual sterile fashion. With the direct visualization trocar in the left upper quadrant the abdomen was entered and pneumoperitoneum was established. Two additional trocars were placed along the mid abdomen.  Significant inflammatory process and turbid ascites noted in the right upper quadrant.  Some inflammatory adhesions between the liver, gallbladder, duodenum, and omentum were taken down.  Eventually we identified the 3 mm bile-stained perforation in the prepyloric region of the stomach.  We verified with air leak test that this was the area of perforation, bubbles were observed.  The defect was primarily repaired with 2-0 Vicryl suture.  A layer of Vistaseal material was applied.  A tongue of falciform ligament was fashion to cover the repaired, this was also tied in place with the long ends of the 2-0 Vicryl suture.  The abdomen was copiously irrigated until effluent was clear and free of debris.  A 15 mm channel drain was placed through the right trocar site and secured to the skin with nylon suture.  The drain was allowed to laid over the area of the gastric ulcer repair.  Trocars were removed under direct visualization without evidence of bleeding. Pneumoperitoneum was released and skin approximated with absorbable suture. Steri-Strips and dressing applied. No immediate  complications. Counts reported correct.

## 2023-06-06 NOTE — ANESTHESIA PROCEDURE NOTES
Airway         Procedure Start/Stop Times: 6/5/2023 9:51 PM  Staff -        Anesthesiologist:  Nomi Sanchez MD       CRNA: Jazmín Lozada APRN CRNA       Performed By: CRNAIndications and Patient Condition       Indications for airway management: ebenezer-procedural and airway protection       Induction type:RSI       Mask difficulty assessment: 0 - not attempted    Final Airway Details       Final airway type: endotracheal airway       Successful airway: ETT - single  Endotracheal Airway Details        ETT size (mm): 8.0       Cuffed: yes       Successful intubation technique: video laryngoscopy       VL Blade Size: Mcdonnell 4       Grade View of Cords: 1       Adjucts: stylet       Position: Right       Measured from: lips       Secured at (cm): 22       Bite block used: None    Post intubation assessment        Placement verified by: capnometry, equal breath sounds and chest rise        Number of attempts at approach: 1       Secured with: pink tape       Ease of procedure: easy       Dentition: Intact and Unchanged    Medication(s) Administered   Medication Administration Time: 6/5/2023 9:51 PM

## 2023-06-07 ENCOUNTER — APPOINTMENT (OUTPATIENT)
Dept: GENERAL RADIOLOGY | Facility: CLINIC | Age: 68
DRG: 328 | End: 2023-06-07
Attending: PHYSICIAN ASSISTANT

## 2023-06-07 LAB
ANION GAP SERPL CALCULATED.3IONS-SCNC: 14 MMOL/L (ref 7–15)
B BURGDOR DNA SPEC QL NAA+PROBE: NOT DETECTED
BACTERIA CSF CULT: NO GROWTH
BUN SERPL-MCNC: 15.1 MG/DL (ref 8–23)
CALCIUM SERPL-MCNC: 8.1 MG/DL (ref 8.8–10.2)
CHLORIDE SERPL-SCNC: 103 MMOL/L (ref 98–107)
CREAT SERPL-MCNC: 0.86 MG/DL (ref 0.67–1.17)
DEPRECATED HCO3 PLAS-SCNC: 22 MMOL/L (ref 22–29)
GFR SERPL CREATININE-BSD FRML MDRD: >90 ML/MIN/1.73M2
GLUCOSE BLDC GLUCOMTR-MCNC: 126 MG/DL (ref 70–99)
GLUCOSE BLDC GLUCOMTR-MCNC: 127 MG/DL (ref 70–99)
GLUCOSE BLDC GLUCOMTR-MCNC: 130 MG/DL (ref 70–99)
GLUCOSE BLDC GLUCOMTR-MCNC: 135 MG/DL (ref 70–99)
GLUCOSE BLDC GLUCOMTR-MCNC: 137 MG/DL (ref 70–99)
GLUCOSE BLDC GLUCOMTR-MCNC: 221 MG/DL (ref 70–99)
GLUCOSE SERPL-MCNC: 131 MG/DL (ref 70–99)
GRAM STAIN RESULT: NORMAL
GRAM STAIN RESULT: NORMAL
POTASSIUM SERPL-SCNC: 3.3 MMOL/L (ref 3.4–5.3)
SODIUM SERPL-SCNC: 139 MMOL/L (ref 136–145)

## 2023-06-07 PROCEDURE — 999N000063 XR UPPER GI WATER SOLUBLE

## 2023-06-07 PROCEDURE — 99232 SBSQ HOSP IP/OBS MODERATE 35: CPT | Performed by: STUDENT IN AN ORGANIZED HEALTH CARE EDUCATION/TRAINING PROGRAM

## 2023-06-07 PROCEDURE — 258N000003 HC RX IP 258 OP 636: Performed by: PHYSICIAN ASSISTANT

## 2023-06-07 PROCEDURE — 250N000011 HC RX IP 250 OP 636: Performed by: PHYSICIAN ASSISTANT

## 2023-06-07 PROCEDURE — C9113 INJ PANTOPRAZOLE SODIUM, VIA: HCPCS | Performed by: PHYSICIAN ASSISTANT

## 2023-06-07 PROCEDURE — 120N000001 HC R&B MED SURG/OB

## 2023-06-07 PROCEDURE — 80048 BASIC METABOLIC PNL TOTAL CA: CPT

## 2023-06-07 PROCEDURE — 36415 COLL VENOUS BLD VENIPUNCTURE: CPT

## 2023-06-07 RX ORDER — BISACODYL 10 MG
10 SUPPOSITORY, RECTAL RECTAL ONCE
Status: DISCONTINUED | OUTPATIENT
Start: 2023-06-08 | End: 2023-06-08

## 2023-06-07 RX ORDER — SODIUM CHLORIDE AND POTASSIUM CHLORIDE 150; 900 MG/100ML; MG/100ML
INJECTION, SOLUTION INTRAVENOUS CONTINUOUS
Status: DISCONTINUED | OUTPATIENT
Start: 2023-06-07 | End: 2023-06-09 | Stop reason: HOSPADM

## 2023-06-07 RX ORDER — AMOXICILLIN 250 MG
1 CAPSULE ORAL 2 TIMES DAILY
Status: DISCONTINUED | OUTPATIENT
Start: 2023-06-07 | End: 2023-06-09

## 2023-06-07 RX ORDER — ACETAMINOPHEN 325 MG/1
650 TABLET ORAL EVERY 4 HOURS PRN
Status: DISCONTINUED | OUTPATIENT
Start: 2023-06-07 | End: 2023-06-09

## 2023-06-07 RX ADMIN — HYDROMORPHONE HYDROCHLORIDE 0.4 MG: 0.2 INJECTION, SOLUTION INTRAMUSCULAR; INTRAVENOUS; SUBCUTANEOUS at 04:25

## 2023-06-07 RX ADMIN — HEPARIN SODIUM 5000 UNITS: 5000 INJECTION, SOLUTION INTRAVENOUS; SUBCUTANEOUS at 08:22

## 2023-06-07 RX ADMIN — HYDROMORPHONE HYDROCHLORIDE 0.4 MG: 0.2 INJECTION, SOLUTION INTRAMUSCULAR; INTRAVENOUS; SUBCUTANEOUS at 21:21

## 2023-06-07 RX ADMIN — SODIUM CHLORIDE: 9 INJECTION, SOLUTION INTRAVENOUS at 13:03

## 2023-06-07 RX ADMIN — HYDROMORPHONE HYDROCHLORIDE 0.4 MG: 0.2 INJECTION, SOLUTION INTRAMUSCULAR; INTRAVENOUS; SUBCUTANEOUS at 08:21

## 2023-06-07 RX ADMIN — PIPERACILLIN AND TAZOBACTAM 3.38 G: 3; .375 INJECTION, POWDER, FOR SOLUTION INTRAVENOUS at 23:38

## 2023-06-07 RX ADMIN — SODIUM CHLORIDE: 9 INJECTION, SOLUTION INTRAVENOUS at 00:19

## 2023-06-07 RX ADMIN — PIPERACILLIN AND TAZOBACTAM 3.38 G: 3; .375 INJECTION, POWDER, FOR SOLUTION INTRAVENOUS at 18:01

## 2023-06-07 RX ADMIN — HYDROMORPHONE HYDROCHLORIDE 0.4 MG: 0.2 INJECTION, SOLUTION INTRAMUSCULAR; INTRAVENOUS; SUBCUTANEOUS at 11:29

## 2023-06-07 RX ADMIN — HYDROMORPHONE HYDROCHLORIDE 0.4 MG: 0.2 INJECTION, SOLUTION INTRAMUSCULAR; INTRAVENOUS; SUBCUTANEOUS at 16:29

## 2023-06-07 RX ADMIN — PIPERACILLIN AND TAZOBACTAM 3.38 G: 3; .375 INJECTION, POWDER, FOR SOLUTION INTRAVENOUS at 11:29

## 2023-06-07 RX ADMIN — PANTOPRAZOLE SODIUM 40 MG: 40 INJECTION, POWDER, FOR SOLUTION INTRAVENOUS at 13:00

## 2023-06-07 RX ADMIN — HEPARIN SODIUM 5000 UNITS: 5000 INJECTION, SOLUTION INTRAVENOUS; SUBCUTANEOUS at 18:02

## 2023-06-07 RX ADMIN — PIPERACILLIN AND TAZOBACTAM 3.38 G: 3; .375 INJECTION, POWDER, FOR SOLUTION INTRAVENOUS at 00:25

## 2023-06-07 RX ADMIN — HEPARIN SODIUM 5000 UNITS: 5000 INJECTION, SOLUTION INTRAVENOUS; SUBCUTANEOUS at 01:32

## 2023-06-07 RX ADMIN — PIPERACILLIN AND TAZOBACTAM 3.38 G: 3; .375 INJECTION, POWDER, FOR SOLUTION INTRAVENOUS at 05:59

## 2023-06-07 RX ADMIN — PANTOPRAZOLE SODIUM 40 MG: 40 INJECTION, POWDER, FOR SOLUTION INTRAVENOUS at 00:20

## 2023-06-07 ASSESSMENT — ACTIVITIES OF DAILY LIVING (ADL)
ADLS_ACUITY_SCORE: 35
DRESSING/BATHING_DIFFICULTY: NO
CHANGE_IN_FUNCTIONAL_STATUS_SINCE_ONSET_OF_CURRENT_ILLNESS/INJURY: NO
ADLS_ACUITY_SCORE: 35
DIFFICULTY_EATING/SWALLOWING: NO
ADLS_ACUITY_SCORE: 35
WEAR_GLASSES_OR_BLIND: NO
CONCENTRATING,_REMEMBERING_OR_MAKING_DECISIONS_DIFFICULTY: NO
ADLS_ACUITY_SCORE: 35
DOING_ERRANDS_INDEPENDENTLY_DIFFICULTY: NO
TOILETING_ISSUES: NO
ADLS_ACUITY_SCORE: 35
ADLS_ACUITY_SCORE: 18
ADLS_ACUITY_SCORE: 35
ADLS_ACUITY_SCORE: 18
ADLS_ACUITY_SCORE: 18
WALKING_OR_CLIMBING_STAIRS_DIFFICULTY: NO
FALL_HISTORY_WITHIN_LAST_SIX_MONTHS: NO

## 2023-06-07 NOTE — PROGRESS NOTES
[facility]    Medicine Progress Note - Hospitalist Service    Date of Admission:  6/5/2023    Assessment & Plan   Zeyad Machado is a 67 year old male with a PMH significant for DM type II, hyperlipidemia, hypertension, and recent hospitalization for facial palsy thought to be related to giant cell arteritis and was discharged 6/2-6/4 who admitted on 6/5/2023 with abdominal pain found to have pneumoperitoneum on CT.      Perforated gastric ulcer  Patient initially presented with abdominal pain to [facility]. Found to have pneumoperitoneum with suspected gastric perforation taken for emergent exploratory laparotomy and perforated gastric ulcer repair.      - Defer postoperative management including anticoagulation, analgesia, IV fluids, activity, PT OT, and diet to primary surgical team  - NG remains to LIS, no return of bowel function on my assessment today     Cranial nerve VI facial palsy   Patient presented to the ED 6/2/23 with diplopia and left temple pain secondary to left cranial nerve VI palsy thought to be due to giant cell arteritis.  Patient was evaluated by ophthalmology.  Patient had MRI brain MRA of head and neck in April when symptoms started without acute pathology however there was evidence of intracranial atherosclerosis.  Left temporal biopsy shows no evidence of granulomatous inflammation, chronic inflammation, or giant cells or necrosis. 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     - Patient was treated with IV methylprednisolone 6/3 and transitioned to 80 mg prednisone 6/4. Low suspicion for adrenal insufficiency. Received 4mg dexamethasone intraoperatively. Given biopsy negative for GCA, will not resume prednisone. Currently no indication to resume steroids  - Plan to closely with opthalmology at discharge     Type II DM, uncontrolled  HgbA1c 6/2/23 8.1.PTA regimen includes 10 mg Lantus at bedtime, metformin 1000 mg twice daily.  -Glucose checks q4h while NPO  -Medium  "insulin resistant sliding scale currently no lantus needs    Hyperlipidemia  Hypertension  - hold statin and losartan due to being NPO  - BP controlled, will add on PRN hydralazine                 Clinically Significant Risk Factors        # Hypokalemia: Lowest K = 3.3 mmol/L in last 2 days, will replace as needed   # Hypocalcemia: Lowest Ca = 8.1 mg/dL in last 2 days, will monitor and replace as appropriate               # DMII: A1C = 8.1 % (Ref range: <5.7 %) within past 6 months, PRESENT ON ADMISSION  # Overweight: Estimated body mass index is 25.29 kg/m  as calculated from the following:    Height as of 6/2/23: 1.651 m (5' 5\").    Weight as of 6/2/23: 68.9 kg (152 lb)., PRESENT ON ADMISSION          Disposition Plan      Expected Discharge Date: 06/10/2023        Discharge Comments: amber June, DO  Hospitalist Service  [facility]  Securely message with [employer] (more info)  Text page via [employer] Paging/Directory   ______________________________________________________________________    Interval History   Patient just back from walking the halls. Having some pain. No nausea. Tolerating the NG. No BM yet and per nursing no ROBF.     Physical Exam   Vital Signs: Temp: 98.8  F (37.1  C) Temp src: Oral BP: (!) 144/82 Pulse: 97   Resp: 16 SpO2: 95 % O2 Device: None (Room air)    Weight: 0 lbs 0 oz    Constitutional: awake, alert, cooperative, in mild pain, NGT with green output  Respiratory: Clear to auscultation bilaterally, no crackles or wheezing  Cardiovascular: Regular rate and rhythm, normal S1 and S2, and no murmur noted  GI: binder in place, mildly TTP  Skin/Integumen: No rashes, no cyanosis, no edema  Other:     Medical Decision Making       45 MINUTES SPENT BY ME on the date of service doing chart review, history, exam, documentation & further activities per the note.        In reviewing history, Previous hospitalization. Talking with nursing. Assumed care " today  Data     I have personally reviewed the following data over the past 24 hrs:    N/A  \   N/A   / N/A     139 103 15.1 /  130 (H)   3.3 (L) 22 0.86 \       Imaging results reviewed over the past 24 hrs:   No results found for this or any previous visit (from the past 24 hour(s)).

## 2023-06-07 NOTE — PROGRESS NOTES
[facility]  GENERAL SURGERY Progress Note    Admission Date: 2023         Assessment and Plan:     Zeyad Machado is a 67 year old male who was recently hospitalized for giant cell arteritis (discharged ), presented to [facility]  for acute abdominal pain.  His CT showed pneumoperitoneum suspicious for perforated gastric ulcer.  He was transferred here from [facility] due to OR capacity.  He underwent LAPAROSCOPIC ABDOMINAL EXPLORATION, REPAIR OF PERFORATED GASTRIC ULCER, WITH FALCIFORM LIGAMENT PATCH, 2 Day Post-Op.  - Strict NPO, NGT (do not manipulate or irrigate), UGI today if possible  - Pain control- Dilaudid IV PRN  - WBC 15.6 yesterday, continue Zosyn, CBC Thursday  - Continue SHANELLE  - Continue Heparin for DVT prophylaxis  - Continue Protonix bid  - IVF- added K+, BMP tomorrow  - Ambulate 4x day and encourage IS  - Medical management per Hospitalist, much appreciate your assistance    Addendum: UGI- without leak, remove NGT, start sips of sugar free/low sugar clears and clear protein shake, adjust sliding scale and glucose checks to PO. Ok to resume PO meds. Dulcolax supp tomorrow am.               Interval History:     Patient seen via  services. Pain controlled, sore mainly at drain site, +flatus/-BM, UO adequate, ambulating.                      Physical Exam:   Blood pressure (!) 144/82, pulse 97, temperature 98.8  F (37.1  C), temperature source Oral, resp. rate 16, SpO2 95 %.  Temperature Temp  Av.9  F (37.2  C)  Min: 98.5  F (36.9  C)  Max: 99.5  F (37.5  C)   I/O last 3 completed shifts:  In:  [I.V.:]  Out: 2420 [Urine:1550; Emesis/NG output:650; Drains:220]  Constitutional:  Awake and in no apparent distress.   Lungs: No increased work of breathing.   Cardiovascular: Regular rate and rhythm.   Abdomen: Soft, non-distended, appropriately tender at incision(s). SHANELLE drain intact, more clear serosanguinous.   Wound(s): Clean, dry, and intact. No erythema or  drainage.    Extremities: No edema or calf tenderness. +SCDs          Data:     Recent Labs   Lab Test 06/06/23  0651 06/05/23  1514 06/04/23  0722   WBC 15.6* 9.8 15.8*   HGB 11.2* 12.0* 10.6*   HCT 34.4* 37.4* 32.8*    467* 379      Recent Labs   Lab Test 06/07/23  0640 06/06/23  0651 06/05/23  1515    139 133*   POTASSIUM 3.3* 4.5 3.8   CHLORIDE 103 102 97*   CO2 22 25 20*   BUN 15.1 16.0 30.6*   CR 0.86 0.78 0.72     Kyung Minaya PA-C  [facility]  [phone]

## 2023-06-07 NOTE — PLAN OF CARE
Goal Outcome Evaluation:      Plan of Care Reviewed With: patient    Overall Patient Progress: improvingOverall Patient Progress: improving     POD #2 Laparoscopic abdominal exploration, repair of perforated gastric ulcer, with falciform ligament patch. A&Ox4. Is Romansh speaking- understands most English. VSS on RA. Pain managed with PRN IV Dilaudid. Strict NPO. NG to LIS draining brown output. Adequate voiding. Hypoactive BS- reports passing gas, no BM. Abdominal lap sites with steri strips- CDI. Abdominal binder on. SHANELLE drain in place with yellow output- dressing CDI. Up SBA with gait belt & walker- ambulated in halls x1. Tolerating IS. On Tele: NSR. BG checks: 129, 135, 137.  Recent hospitalization for seeing double vision- POD #4 of a Left temporal artery biopsy. Baseline double vision now, wears a patch at times. Continue to monitor.

## 2023-06-07 NOTE — PLAN OF CARE
Goal Outcome Evaluation:       Pt is alert and oriented, jabber or language line used for communication when family not in room, up with stand by assist, NG tube removed, no nausea noted, tolerates diet, incision clean dry and intact, SHANELLE drain in place, output charted, did have BM this shift, good urine output,

## 2023-06-08 LAB
ANION GAP SERPL CALCULATED.3IONS-SCNC: 10 MMOL/L (ref 7–15)
BUN SERPL-MCNC: 9.4 MG/DL (ref 8–23)
CALCIUM SERPL-MCNC: 8.1 MG/DL (ref 8.8–10.2)
CHLORIDE SERPL-SCNC: 104 MMOL/L (ref 98–107)
CREAT SERPL-MCNC: 0.72 MG/DL (ref 0.67–1.17)
DEPRECATED HCO3 PLAS-SCNC: 23 MMOL/L (ref 22–29)
ERYTHROCYTE [DISTWIDTH] IN BLOOD BY AUTOMATED COUNT: 12.5 % (ref 10–15)
GFR SERPL CREATININE-BSD FRML MDRD: >90 ML/MIN/1.73M2
GLUCOSE BLDC GLUCOMTR-MCNC: 154 MG/DL (ref 70–99)
GLUCOSE BLDC GLUCOMTR-MCNC: 162 MG/DL (ref 70–99)
GLUCOSE BLDC GLUCOMTR-MCNC: 216 MG/DL (ref 70–99)
GLUCOSE BLDC GLUCOMTR-MCNC: 272 MG/DL (ref 70–99)
GLUCOSE BLDC GLUCOMTR-MCNC: 300 MG/DL (ref 70–99)
GLUCOSE SERPL-MCNC: 164 MG/DL (ref 70–99)
HCT VFR BLD AUTO: 29.7 % (ref 40–53)
HGB BLD-MCNC: 9.5 G/DL (ref 13.3–17.7)
MCH RBC QN AUTO: 25.5 PG (ref 26.5–33)
MCHC RBC AUTO-ENTMCNC: 32 G/DL (ref 31.5–36.5)
MCV RBC AUTO: 80 FL (ref 78–100)
PLATELET # BLD AUTO: 279 10E3/UL (ref 150–450)
POTASSIUM SERPL-SCNC: 3.6 MMOL/L (ref 3.4–5.3)
RBC # BLD AUTO: 3.73 10E6/UL (ref 4.4–5.9)
SODIUM SERPL-SCNC: 137 MMOL/L (ref 136–145)
WBC # BLD AUTO: 9.2 10E3/UL (ref 4–11)

## 2023-06-08 PROCEDURE — 85027 COMPLETE CBC AUTOMATED: CPT | Performed by: PHYSICIAN ASSISTANT

## 2023-06-08 PROCEDURE — 250N000011 HC RX IP 250 OP 636: Performed by: PHYSICIAN ASSISTANT

## 2023-06-08 PROCEDURE — 250N000012 HC RX MED GY IP 250 OP 636 PS 637: Performed by: STUDENT IN AN ORGANIZED HEALTH CARE EDUCATION/TRAINING PROGRAM

## 2023-06-08 PROCEDURE — 80048 BASIC METABOLIC PNL TOTAL CA: CPT | Performed by: PHYSICIAN ASSISTANT

## 2023-06-08 PROCEDURE — 250N000013 HC RX MED GY IP 250 OP 250 PS 637: Performed by: STUDENT IN AN ORGANIZED HEALTH CARE EDUCATION/TRAINING PROGRAM

## 2023-06-08 PROCEDURE — 99231 SBSQ HOSP IP/OBS SF/LOW 25: CPT | Performed by: STUDENT IN AN ORGANIZED HEALTH CARE EDUCATION/TRAINING PROGRAM

## 2023-06-08 PROCEDURE — 250N000013 HC RX MED GY IP 250 OP 250 PS 637: Performed by: SURGERY

## 2023-06-08 PROCEDURE — 250N000013 HC RX MED GY IP 250 OP 250 PS 637: Performed by: PHYSICIAN ASSISTANT

## 2023-06-08 PROCEDURE — 120N000001 HC R&B MED SURG/OB

## 2023-06-08 PROCEDURE — C9113 INJ PANTOPRAZOLE SODIUM, VIA: HCPCS | Performed by: PHYSICIAN ASSISTANT

## 2023-06-08 PROCEDURE — 36415 COLL VENOUS BLD VENIPUNCTURE: CPT | Performed by: PHYSICIAN ASSISTANT

## 2023-06-08 RX ORDER — LOSARTAN POTASSIUM 25 MG/1
25 TABLET ORAL DAILY
Status: DISCONTINUED | OUTPATIENT
Start: 2023-06-08 | End: 2023-06-09 | Stop reason: HOSPADM

## 2023-06-08 RX ORDER — LIDOCAINE 4 G/G
1 PATCH TOPICAL
Status: DISCONTINUED | OUTPATIENT
Start: 2023-06-08 | End: 2023-06-09 | Stop reason: HOSPADM

## 2023-06-08 RX ADMIN — HEPARIN SODIUM 5000 UNITS: 5000 INJECTION, SOLUTION INTRAVENOUS; SUBCUTANEOUS at 01:07

## 2023-06-08 RX ADMIN — PIPERACILLIN AND TAZOBACTAM 3.38 G: 3; .375 INJECTION, POWDER, FOR SOLUTION INTRAVENOUS at 11:36

## 2023-06-08 RX ADMIN — PANTOPRAZOLE SODIUM 40 MG: 40 INJECTION, POWDER, FOR SOLUTION INTRAVENOUS at 01:02

## 2023-06-08 RX ADMIN — PANTOPRAZOLE SODIUM 40 MG: 40 INJECTION, POWDER, FOR SOLUTION INTRAVENOUS at 12:31

## 2023-06-08 RX ADMIN — HEPARIN SODIUM 5000 UNITS: 5000 INJECTION, SOLUTION INTRAVENOUS; SUBCUTANEOUS at 17:15

## 2023-06-08 RX ADMIN — PIPERACILLIN AND TAZOBACTAM 3.38 G: 3; .375 INJECTION, POWDER, FOR SOLUTION INTRAVENOUS at 05:46

## 2023-06-08 RX ADMIN — HEPARIN SODIUM 5000 UNITS: 5000 INJECTION, SOLUTION INTRAVENOUS; SUBCUTANEOUS at 08:51

## 2023-06-08 RX ADMIN — INSULIN GLARGINE 10 UNITS: 100 INJECTION, SOLUTION SUBCUTANEOUS at 22:39

## 2023-06-08 RX ADMIN — POTASSIUM CHLORIDE AND SODIUM CHLORIDE: 900; 150 INJECTION, SOLUTION INTRAVENOUS at 11:40

## 2023-06-08 RX ADMIN — LIDOCAINE 1 PATCH: 560 PATCH PERCUTANEOUS; TOPICAL; TRANSDERMAL at 22:39

## 2023-06-08 RX ADMIN — PIPERACILLIN AND TAZOBACTAM 3.38 G: 3; .375 INJECTION, POWDER, FOR SOLUTION INTRAVENOUS at 23:17

## 2023-06-08 RX ADMIN — LOSARTAN POTASSIUM 25 MG: 25 TABLET, FILM COATED ORAL at 14:20

## 2023-06-08 RX ADMIN — HYDROMORPHONE HYDROCHLORIDE 0.4 MG: 0.2 INJECTION, SOLUTION INTRAMUSCULAR; INTRAVENOUS; SUBCUTANEOUS at 11:40

## 2023-06-08 RX ADMIN — HYDROMORPHONE HYDROCHLORIDE 0.4 MG: 0.2 INJECTION, SOLUTION INTRAMUSCULAR; INTRAVENOUS; SUBCUTANEOUS at 04:21

## 2023-06-08 RX ADMIN — ACETAMINOPHEN 650 MG: 325 TABLET ORAL at 22:39

## 2023-06-08 RX ADMIN — OXYCODONE HYDROCHLORIDE 5 MG: 5 TABLET ORAL at 17:15

## 2023-06-08 RX ADMIN — POTASSIUM CHLORIDE AND SODIUM CHLORIDE: 900; 150 INJECTION, SOLUTION INTRAVENOUS at 01:05

## 2023-06-08 RX ADMIN — ACETAMINOPHEN 650 MG: 325 TABLET ORAL at 05:46

## 2023-06-08 RX ADMIN — ACETAMINOPHEN 650 MG: 325 TABLET ORAL at 17:15

## 2023-06-08 RX ADMIN — PIPERACILLIN AND TAZOBACTAM 3.38 G: 3; .375 INJECTION, POWDER, FOR SOLUTION INTRAVENOUS at 17:15

## 2023-06-08 ASSESSMENT — ACTIVITIES OF DAILY LIVING (ADL)
ADLS_ACUITY_SCORE: 18
DEPENDENT_IADLS:: INDEPENDENT
ADLS_ACUITY_SCORE: 18

## 2023-06-08 NOTE — PROGRESS NOTES
[facility]    Medicine Progress Note - Hospitalist Service    Date of Admission:  6/5/2023    Assessment & Plan   Zeyad Machado is a 67 year old male with a PMH significant for DM type II, hyperlipidemia, hypertension, and recent hospitalization for facial palsy thought to be related to giant cell arteritis and was discharged 6/2-6/4 who admitted on 6/5/2023 with abdominal pain found to have pneumoperitoneum on CT.      Perforated gastric ulcer  Patient initially presented with abdominal pain to [facility]. Found to have pneumoperitoneum with suspected gastric perforation taken for emergent exploratory laparotomy and perforated gastric ulcer repair.    - BM yesterday and bowel function slowly returning. UGI without leak and diet advanced to full liquid today     Cranial nerve VI facial palsy   Patient presented to the ED 6/2/23 with diplopia and left temple pain secondary to left cranial nerve VI palsy thought to be due to giant cell arteritis.  Patient was evaluated by ophthalmology.  Patient had MRI brain MRA of head and neck in April when symptoms started without acute pathology however there was evidence of intracranial atherosclerosis.  Left temporal biopsy shows no evidence of granulomatous inflammation, chronic inflammation, or giant cells or necrosis. 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     - Patient was treated with IV methylprednisolone 6/3 and transitioned to 80 mg prednisone 6/4.. Given biopsy negative for GCA, will not resume prednisone. Currently no indication to resume steroids  - Plan to closely with opthalmology at discharge     Type II DM, uncontrolled  HgbA1c 6/2/23 8.1.PTA regimen includes 10 mg Lantus at bedtime, metformin 1000 mg twice daily.  - resume lantus today, continue sliding scale insulin    Hyperlipidemia  Hypertension  - resume home losartan, continue to hold statin to reduce pill burden                 Clinically Significant Risk Factors        #  "Hypokalemia: Lowest K = 3.3 mmol/L in last 2 days, will replace as needed                 # DMII: A1C = 8.1 % (Ref range: <5.7 %) within past 6 months, PRESENT ON ADMISSION  # Overweight: Estimated body mass index is 25.29 kg/m  as calculated from the following:    Height as of 6/2/23: 1.651 m (5' 5\").    Weight as of 6/2/23: 68.9 kg (152 lb)., PRESENT ON ADMISSION          Disposition Plan      Expected Discharge Date: 06/10/2023        Discharge Comments: amber June, DO  Hospitalist Service  [facility]  Securely message with [employer] (more info)  Text page via [employer] Paging/Directory   ______________________________________________________________________    Interval History   Patient sleeping but easily wakes up. Nursing reports he has been up ambulating the halls requently. Doing well with diet. He reports some pain her his SHANELLE drain but otherwise no complaints.     Physical Exam   Vital Signs: Temp: 98.7  F (37.1  C) Temp src: Oral BP: (!) 153/81 Pulse: 88   Resp: 16 SpO2: 97 % O2 Device: None (Room air)    Weight: 0 lbs 0 oz    Constitutional: awake, alert, cooperative  GI: SHANELLE drain noted with small amount of serosanguinous fluid  Skin/Integumen: No rashes, no cyanosis, no edema  Other:     Medical Decision Making       30 MINUTES SPENT BY ME on the date of service doing chart review, history, exam, documentation & further activities per the note.        In reviewing history, Previous hospitalization. Talking with nursing. Assumed care today  Data     I have personally reviewed the following data over the past 24 hrs:    9.2  \   9.5 (L)   / 279     137 104 9.4 /  300 (H)   3.6 23 0.72 \       Imaging results reviewed over the past 24 hrs:   Recent Results (from the past 24 hour(s))   XR Upper GI Water Soluble    Narrative    UPPER GI WATER SOLUBLE  6/7/2023 2:40 PM     HISTORY: Status post repair of gastric perforation 6/5, ok to use NGT.    TECHNIQUE: 0.9 minutes " fluoroscopy. 3 spot images.    COMPARISON: CT 6/5/2023    FINDINGS: The  film demonstrates an NG tube in the stomach and a  surgical drain in the epigastric region. Water-soluble contrast  injected through the existing NG tube. Contrast passes through the  stomach into the duodenum. No obstruction or leak. Specifically, no  prepyloric leak. Contrast was evacuated via the NG tube after the  examination.      Impression    IMPRESSION:  Intact repair of gastric ulcer. No leak.    LAURY SONG MD         SYSTEM ID:  [id number]

## 2023-06-08 NOTE — CONSULTS
Care Management Initial Consult    General Information  Assessment completed with: Patient, VM-chart review,    Type of CM/SW Visit: Initial Assessment    Primary Care Provider verified and updated as needed: Yes   Readmission within the last 30 days: current reason for admission unrelated to previous admission   Return Category: New Diagnosis  Reason for Consult: discharge planning  Advance Care Planning: Advance Care Planning Reviewed: verified with patient          Communication Assessment  Patient's communication style: spoken language (non-English)    Hearing Difficulty or Deaf: no   Wear Glasses or Blind: no    Cognitive  Cognitive/Neuro/Behavioral: .WDL except (Double vision; wears a patch at times)  Level of Consciousness: alert  Arousal Level: opens eyes spontaneously  Orientation: oriented x 4  Mood/Behavior: calm, cooperative  Best Language: 0 - No aphasia  Speech: clear, logical    Living Environment:   People in home: child(allie), adult, grandchild(allie)     Current living Arrangements: house      Able to return to prior arrangements: yes       Family/Social Support:  Care provided by: self  Provides care for: no one  Marital Status: Single  Children          Description of Support System: Supportive         Current Resources:   Patient receiving home care services: No     Community Resources: None  Equipment currently used at home: none  Supplies currently used at home: None    Employment/Financial:  Employment Status:          Financial Concerns: insurance, none   Referral to Financial Worker: Yes       Does the patient's insurance plan have a 3 day qualifying hospital stay waiver?  No    Lifestyle & Psychosocial Needs:  Social Determinants of Health     Tobacco Use: High Risk (6/6/2023)    Patient History      Smoking Tobacco Use: Every Day      Smokeless Tobacco Use: Never      Passive Exposure: Not on file   Alcohol Use: Not on file   Financial Resource Strain: Not on file   Food Insecurity: Not on  file   Transportation Needs: Not on file   Physical Activity: Not on file   Stress: Not on file   Social Connections: Not on file   Intimate Partner Violence: Not on file   Depression: Not on file   Housing Stability: Not on file       Functional Status:  Prior to admission patient needed assistance:   Dependent ADLs:: Independent  Dependent IADLs:: Independent       Mental Health Status:  Mental Health Status: No Current Concerns       Chemical Dependency Status:  Chemical Dependency Status: No Current Concerns             Values/Beliefs:  Spiritual, Cultural Beliefs, Islam Practices, Values that affect care: no               Additional Information:  Initial consult done with patient. No needs identified outside of assessing for insurance coverage. Patient would like the financial counselors to speak to his daughter. Writer messaged financial counselors.     Shea Cantu RN   [facility]   Phone [phone]

## 2023-06-08 NOTE — PROGRESS NOTES
Will remove SHANELLE today and use lido patch in the area.    [facility]  GENERAL SURGERY Progress Note    Admission Date: 6/5/2023 6/8/2023         Assessment and Plan:     Zeyad Machado is a 67 year old male who was recently hospitalized for giant cell arteritis (discharged 6/4), presented to [facility] 6/5 for acute abdominal pain.  His CT showed pneumoperitoneum suspicious for perforated gastric ulcer.  He was transferred here from [facility] due to OR capacity.  He underwent LAPAROSCOPIC ABDOMINAL EXPLORATION, REPAIR OF PERFORATED GASTRIC ULCER, WITH FALCIFORM LIGAMENT PATCH, 3 Days Post-Op.  - Advance diet to full liquid diet  - Pain control- Tylenol and Oxy prn, limit Dialudid  - WBC 9.2, continue Zosyn for today, no abx at discharge   - Continue SHANELLE, will remove prior to discharge tomorrow  - Continue Heparin for DVT prophylaxis   - Continue Protonix bid  - Change Senna from BID to BID PRN due to loose stools x 3, hold Dulcolax supp  - Labs ok  - Ambulate 4x day and encourage IS  - Med management per hospitalist, much appreciate your assistance- resume home meds, discharge recs  - Probable discharge tomorrow    Agree with note. Start full liquid diet. Resume home meds per hospitalist recommendations. Appreciate your help. Plan for home tomorrow. Will need to follow up with GI as OP for EGD in 8-10 weeks.     Kyung Minaya PA-C             Interval History:     Patient seen via  services. Zeyad is feeling good this morning and is asking how much longer he will be in the hospital. His pain is mostly well controlled, his drain site is still giving him pain on palpation. + flatus/+BM, UO adequate, ambulating. Tolerating diet well, denies nausea, vomiting, abdominal pain while eating, bloating. Is asking to sit in his chair more because he is uncomfortable in his bed. Eager to get home.                      Physical Exam:   Blood pressure (!) 153/81, pulse 88, temperature 98.7  F (37.1  C), temperature  source Oral, resp. rate 16, SpO2 97 %.  Temperature Temp  Av.6  F (37  C)  Min: 97.6  F (36.4  C)  Max: 99  F (37.2  C)   I/O last 3 completed shifts:  In: 2685 [P.O.:220; I.V.:2465]  Out: 1230 [Urine:1000; Emesis/NG output:100; Drains:130]  Constitutional:  Awake and in no apparent distress.   Lungs: No increased work of breathing, good air exchange, clear to auscultation bilaterally, and no crackles or wheezing.   Cardiovascular: Regular rate and rhythm, normal S1 and S2, and no murmur noted.   Abdomen: Soft, non-distended, appropriately tender at incisions and increased tenderness near drain site, + BS. SHANELLE drain intact, moderate amount of serosanguinous drainage.   Wound(s): Clean, dry, and intact. Small amount of drainage on SHANELLE site bandage.    Extremities: No edema or calf tenderness.          Data:     Recent Labs   Lab Test 23  0711 23  0651 23  1514   WBC 9.2 15.6* 9.8   HGB 9.5* 11.2* 12.0*   HCT 29.7* 34.4* 37.4*    367 467*     Last Comprehensive Metabolic Panel:  Lab Results   Component Value Date     2023    POTASSIUM 3.6 2023    CHLORIDE 104 2023    CO2 23 2023    ANIONGAP 10 2023     (H) 2023    BUN 9.4 2023    CR 0.72 2023    GFRESTIMATED >90 2023    LULU 8.1 (L) 2023     [facility] Student   General Surgery

## 2023-06-08 NOTE — PLAN OF CARE
Goal Outcome Evaluation:      Plan of Care Reviewed With: patient    Pt is alert and oriented, up with stand by assist, diet advanced to full liquids, tolerates well, continues to have loose BM's, good urine output, pain well controled with IV pain medications, SHANELLE drain removed per MD orders, output charted,  jabber used

## 2023-06-08 NOTE — PLAN OF CARE
Goal Outcome Evaluation:      Plan of Care Reviewed With: patient    Overall Patient Progress: improvingOverall Patient Progress: improving     POD #3 Laparoscopic abdominal exploration, repair of perforated gastric ulcer, with falciform ligament patch. A&Ox4. Is Welsh speaking- understands most English. VSS on RA. Pain managed with PRN IV Dilaudid & Tylenol. Tolerating clears. Adequate voiding. Audible BS- 3x loose/watery brown stools. Abdominal lap sites with steri strips- CDI. SHANELLE drain in place with yellow output. Up SBA with gait belt & walker- ambulated in room. Tolerating IS. On Tele: NSR. BG checks: 221 & 162.  Recent hospitalization for seeing double vision- POD #5 of a Left temporal artery biopsy. Baseline double vision now, wears a patch at times. Continue to monitor.

## 2023-06-08 NOTE — PROGRESS NOTES
Care Management Discharge Note    Discharge Date: 06/10/2023       Discharge Disposition: Home    Discharge Services: None    Discharge DME: None    Discharge Transportation: family or friend will provide    Private pay costs discussed: messaged financial counselors    Does the patient's insurance plan have a 3 day qualifying hospital stay waiver?  No    PAS Confirmation Code:    Patient/family educated on Medicare website which has current facility and service quality ratings:      Education Provided on the Discharge Plan:    Persons Notified of Discharge Plans: patient  Patient/Family in Agreement with the Plan: yes    Handoff Referral Completed: No    Additional Information:  Home with assist of daughter when ready to discharge.     Shea Cantu RN   [facility]   Phone [phone]

## 2023-06-09 VITALS
SYSTOLIC BLOOD PRESSURE: 148 MMHG | OXYGEN SATURATION: 98 % | RESPIRATION RATE: 18 BRPM | HEART RATE: 84 BPM | TEMPERATURE: 98.8 F | DIASTOLIC BLOOD PRESSURE: 83 MMHG

## 2023-06-09 LAB
GLUCOSE BLDC GLUCOMTR-MCNC: 133 MG/DL (ref 70–99)
GLUCOSE BLDC GLUCOMTR-MCNC: 154 MG/DL (ref 70–99)
GLUCOSE BLDC GLUCOMTR-MCNC: 195 MG/DL (ref 70–99)

## 2023-06-09 PROCEDURE — C9113 INJ PANTOPRAZOLE SODIUM, VIA: HCPCS | Performed by: PHYSICIAN ASSISTANT

## 2023-06-09 PROCEDURE — 99231 SBSQ HOSP IP/OBS SF/LOW 25: CPT | Performed by: STUDENT IN AN ORGANIZED HEALTH CARE EDUCATION/TRAINING PROGRAM

## 2023-06-09 PROCEDURE — 250N000013 HC RX MED GY IP 250 OP 250 PS 637: Performed by: STUDENT IN AN ORGANIZED HEALTH CARE EDUCATION/TRAINING PROGRAM

## 2023-06-09 PROCEDURE — 250N000013 HC RX MED GY IP 250 OP 250 PS 637: Performed by: PHYSICIAN ASSISTANT

## 2023-06-09 PROCEDURE — 250N000011 HC RX IP 250 OP 636: Performed by: PHYSICIAN ASSISTANT

## 2023-06-09 RX ORDER — ACETAMINOPHEN 325 MG/1
975 TABLET ORAL EVERY 6 HOURS PRN
Status: DISCONTINUED | OUTPATIENT
Start: 2023-06-09 | End: 2023-06-09 | Stop reason: HOSPADM

## 2023-06-09 RX ORDER — AMOXICILLIN 250 MG
1 CAPSULE ORAL 2 TIMES DAILY PRN
Status: DISCONTINUED | OUTPATIENT
Start: 2023-06-09 | End: 2023-06-09 | Stop reason: HOSPADM

## 2023-06-09 RX ORDER — PANTOPRAZOLE SODIUM 40 MG/1
40 TABLET, DELAYED RELEASE ORAL DAILY
Qty: 90 TABLET | Refills: 0 | Status: SHIPPED | OUTPATIENT
Start: 2023-06-09

## 2023-06-09 RX ORDER — OXYCODONE HYDROCHLORIDE 5 MG/1
5 TABLET ORAL EVERY 4 HOURS PRN
Qty: 6 TABLET | Refills: 0 | Status: SHIPPED | OUTPATIENT
Start: 2023-06-09

## 2023-06-09 RX ADMIN — PIPERACILLIN AND TAZOBACTAM 3.38 G: 3; .375 INJECTION, POWDER, FOR SOLUTION INTRAVENOUS at 11:52

## 2023-06-09 RX ADMIN — PIPERACILLIN AND TAZOBACTAM 3.38 G: 3; .375 INJECTION, POWDER, FOR SOLUTION INTRAVENOUS at 05:25

## 2023-06-09 RX ADMIN — OXYCODONE HYDROCHLORIDE 5 MG: 5 TABLET ORAL at 13:36

## 2023-06-09 RX ADMIN — HEPARIN SODIUM 5000 UNITS: 5000 INJECTION, SOLUTION INTRAVENOUS; SUBCUTANEOUS at 00:30

## 2023-06-09 RX ADMIN — OXYCODONE HYDROCHLORIDE 5 MG: 5 TABLET ORAL at 08:56

## 2023-06-09 RX ADMIN — LOSARTAN POTASSIUM 25 MG: 25 TABLET, FILM COATED ORAL at 08:56

## 2023-06-09 RX ADMIN — PANTOPRAZOLE SODIUM 40 MG: 40 INJECTION, POWDER, FOR SOLUTION INTRAVENOUS at 00:30

## 2023-06-09 RX ADMIN — HEPARIN SODIUM 5000 UNITS: 5000 INJECTION, SOLUTION INTRAVENOUS; SUBCUTANEOUS at 08:50

## 2023-06-09 RX ADMIN — POTASSIUM CHLORIDE AND SODIUM CHLORIDE: 900; 150 INJECTION, SOLUTION INTRAVENOUS at 03:38

## 2023-06-09 ASSESSMENT — ACTIVITIES OF DAILY LIVING (ADL)
ADLS_ACUITY_SCORE: 18

## 2023-06-09 NOTE — PROGRESS NOTES
Care Management Discharge Note    Discharge Date: 06/10/2023       Discharge Disposition: Home    Discharge Services: None    Discharge DME: None    Discharge Transportation: family or friend will provide    Private pay costs discussed: financial counselors are working with patient and family    Does the patient's insurance plan have a 3 day qualifying hospital stay waiver?  No    PAS Confirmation Code:    Patient/family educated on Medicare website which has current facility and service quality ratings:      Education Provided on the Discharge Plan:    Persons Notified of Discharge Plans:   Patient/Family in Agreement with the Plan: yes    Handoff Referral Completed: No    Additional Information:  All follow ups coordinated except GI who will need to call patient.     Shea Cantu RN   [facility]   Phone [phone]

## 2023-06-09 NOTE — CONSULTS
Care Management Follow Up    Length of Stay (days): 4    Expected Discharge Date: 06/10/2023     Concerns to be Addressed:       Patient plan of care discussed at interdisciplinary rounds: Yes    Anticipated Discharge Disposition: Home     Anticipated Discharge Services: None  Anticipated Discharge DME: None    Patient/family educated on Medicare website which has current facility and service quality ratings:    Education Provided on the Discharge Plan:    Patient/Family in Agreement with the Plan: yes    Referrals Placed by CM/SW:    Private pay costs discussed: Not applicable, financial counselors will contact daughter to discuss insurance eligibility per patient preference.     Additional Information:  Writer coordinated PCP follow up for June 19th.     GI consult team will review chart and call patient to coordinate appointment.     Writer has call out to [facility] to coordinated opthalmology follow up. Voicemail message left for return phone call. 1131: Clinic called back and patient has seen Dr. Zhang there and a follow up appointment has been scheduled for Thursday, July 13th at 12:30 PM. AVS updated with information.       Shea Cantu RN   [facility]   Phone [phone]

## 2023-06-09 NOTE — DISCHARGE SUMMARY
Discharge Summary    Zeyad Machado MRN# [id number]   YOB: 1955 Age: 67 year old     Date of Admission:  6/5/2023  Date of Discharge:  6/9/2023  Admitting Physician:  Kevin Mercedes MD  Discharging Service:  Surgery  Primary Provider: [employer] [facility], [facility]         Admission/Discharge Diagnosis:   Principle Diagnosis: Perforated gastric ulcer (H) [K25.5]  Secondary diagnosis: Diabetes, HTN         Procedures:   Procedure(s):  LAPAROSCOPIC ABDOMINAL EXPLORATION, REPAIR OF PERFORATED GASTRIC ULCER, WITH FALCIFORM LIGAMENT PATCH         Brief History of Illness:   This patient was a 67 year old male who was recently hospitalized for giant cell arteritis (discharged 6/4), presented to [facility] 6/5 for acute abdominal pain.  His CT showed pneumoperitoneum suspicious for perforated gastric ulcer.  He was transferred here from [facility] due to OR capacity. After discussing the risks, benefits, and possible complications, an informed consent was obtained and the patient underwent the above procedure. Please see the Operative Report for full details.         Hospital Course:   The patient recovered as anticipated.  He had ROBF and an UGI without evidence of a leak on POD 2.  His SHANELLE drain was removed POD 3.  The patient's pain was controlled and he was tolerating food on day of discharge.  The patient was discharged home in stable condition by the surgical service.  His ASA and prednisone were discontinued due to He verbalized understanding of all discharge instructions.  He was asked to call with any further questions or concerns.  Please see chart for details.          Consultations:     Consultation during this admission received from hospitalist.           Discharge Disposition:     Discharged to home          Condition on Discharge:     Discharge condition: Stable   Discharge vitals: Blood pressure (!) 148/83, pulse 84, temperature 98.8  F (37.1  C), temperature source Oral, resp. rate 18, SpO2 98 %.           Discharge Medications:     Current Discharge Medication List      START taking these medications    Details   oxyCODONE (ROXICODONE) 5 MG tablet Take 1 tablet (5 mg) by mouth every 4 hours as needed for moderate pain  Qty: 6 tablet, Refills: 0    Associated Diagnoses: Acute post-operative pain      pantoprazole (PROTONIX) 40 MG EC tablet Take 1 tablet (40 mg) by mouth daily  Qty: 90 tablet, Refills: 0    Associated Diagnoses: Acute gastric ulcer with perforation (H)         CONTINUE these medications which have NOT CHANGED    Details   atorvastatin (LIPITOR) 40 MG tablet TOME CÉSAR TABLETA POR LA BOCA CADA NOCHE      insulin glargine (LANTUS PEN) 100 UNIT/ML pen Inject 10 Units Subcutaneous At Bedtime  Qty: 15 mL, Refills: 0    Comments: If Lantus is not covered by insurance, may substitute Basaglar or Semglee or other insulin glargine product per insurance preference at same dose and frequency.    Associated Diagnoses: Type 2 diabetes mellitus without complication, without long-term current use of insulin (H)      losartan (COZAAR) 25 MG tablet TOME CÉSAR TABLETA POR LA BOCA CADA NAREN      metFORMIN (GLUCOPHAGE) 1000 MG tablet Take 1 tablet (1,000 mg) by mouth 2 times daily (with meals)  Qty: 60 tablet, Refills: 0    Associated Diagnoses: Type 2 diabetes mellitus without complication, without long-term current use of insulin (H)      SENNA-TIME 8.6 MG tablet TAKE ONE TABLET BY MOUTH AS DIRECTED 1-2 veces EVERY DAY AS NEEDED para estrenimiento         STOP taking these medications:         aspirin (ASA) 81 MG chewable tablet Comments:   Reason for Stopping:         predniSONE (DELTASONE) 20 MG tablet Comments:   Reason for Stopping:                  Discharge Instructions:   Follow up with Dr. Mercedes June 21st at 11:45am. We are located at [address] [place] [address]. Call [phone] if you   have any concerns.     Follow up GI in 8-10 weeks for EGD and H pylori testing. --The clinic will    call you to schedule this if you do not hear from them by June 15th please   call them at [phone]    Follow up with ophthalmology.-- Please see Dr. Zhang at [facility] on Thursday, July 13th at 12:30 pm. The clinic address [address]   [facility] in [place], [place]    Follow up with your primary care provider, Manisha Weaver NP on Monday, June 19th at 10:10 AM please check in at 9:55 AM. [facility] at [facility] on [address] [place] [address]. This is for hospital   follow up and your medical issues like diabetes.           After Care Instructions     Activity      Your activity upon discharge: activity as tolerated. No heavy lifting > 20 lbs or strenuous exercise x 2-3 weeks. No driving or alcohol while on pain meds.         Diet      Follow this diet upon discharge: low residue, soft foods, small amounts         Wound care and dressings      Instructions to care for your wound at home: keep wound(s) clean and dry. You may shower, but do not soak incisions x 2 weeks. Leave steri strips in place, they will fall off on their own. Apply dry dressing as needed.               Kyung Minaya PA-C, dictating on behalf of Kevin Mercedes MD  Office #: [phone]

## 2023-06-09 NOTE — PLAN OF CARE
Patient discharging home with daughter. AVS gone over with patient in room via  services and prescriptions given to patient. PIV removed. All questions answered.

## 2023-06-09 NOTE — PLAN OF CARE
Pt is AOx4, Portuguese speaker, understands some english, pain is managed with oxy and tylenol, abdomen soft, tender, passing gas, had multiple loose stools today, BS +, tolerating full liquid, denied n/v, ambulated in velasco SBA, progressing, possible discharge tomorrow.

## 2023-06-09 NOTE — PROVIDER NOTIFICATION
"MD Notification    Notified Person: MD    Notified Person Name: Kyung Minaya    Notification Date/Time: 06/09/23 12:13 PM    Notification Interaction: [employer]    Purpose of Notification: \"Hi, just checking regarding discharge orders? Thank you!\"    Orders Received:    Comments:      "

## 2023-06-09 NOTE — PROGRESS NOTES
[facility]  GENERAL SURGERY Progress Note    Admission Date: 2023         Assessment and Plan:     Zeyad Machado is a 67 year old male who was recently hospitalized for giant cell arteritis (discharged ), presented to [facility]  for acute abdominal pain.  His CT showed pneumoperitoneum suspicious for perforated gastric ulcer.  He was transferred here from [facility] due to OR capacity.  He underwent LAPAROSCOPIC ABDOMINAL EXPLORATION, REPAIR OF PERFORATED GASTRIC ULCER, WITH FALCIFORM LIGAMENT PATCH, 4 Days Post-Op.  - Low residue diet  - Pain control- Tylenol and Oxy prn, Lido patch  - WBC 9.2 yesterday, continue Zosyn until discharge, no abx at discharge   - Continue Heparin for DVT prophylaxis   - Continue Protonix bid, daily at discharge  - Ambulate 4x day and encourage IS  - Med management per hospitalist, much appreciate your assistance- discharge meds/ recs  - Discharge today if ok with medicine, follow up with GI as OP for EGD in 8-10 weeks- referral placed.             Interval History:     Intermittent HTN, resumed Cozaar yesterday, sore at drain site but has not been utilizing any meds, tolerating diet, hungry, +Flatus/BM loose, UO adequate, ambulating.                     Physical Exam:   Blood pressure (!) 129/91, pulse 85, temperature 98  F (36.7  C), temperature source Oral, resp. rate 18, SpO2 99 %.  Temperature Temp  Av.3  F (36.8  C)  Min: 98  F (36.7  C)  Max: 98.6  F (37  C)   I/O last 3 completed shifts:  In: 2405 [P.O.:1560; I.V.:845]  Out: 1450 [Urine:1400; Drains:50]  Constitutional:  Awake and in no apparent distress.   Lungs: No increased work of breathing.   Cardiovascular: Regular rate and rhythm.   Abdomen: Soft, non-distended, appropriately tender at incision(s).   Wound(s): Clean, dry, and intact. No erythema or drainage.    Extremities: No edema or calf tenderness. +SCDs          Data:   No new labs  Recent Labs   Lab Test 23  0711 23  0656  06/05/23  1514   WBC 9.2 15.6* 9.8   HGB 9.5* 11.2* 12.0*   HCT 29.7* 34.4* 37.4*    367 467*      Recent Labs   Lab Test 06/08/23  0711 06/07/23  0640 06/06/23  0651    139 139   POTASSIUM 3.6 3.3* 4.5   CHLORIDE 104 103 102   CO2 23 22 25   BUN 9.4 15.1 16.0   CR 0.72 0.86 0.78     Kyung Minaya PA-C  [facility]  [phone]

## 2023-06-09 NOTE — PROGRESS NOTES
[facility]    Medicine Progress Note - Hospitalist Service    Date of Admission:  6/5/2023    Assessment & Plan   Zeyad Machado is a 67 year old male with a PMH significant for DM type II, hyperlipidemia, hypertension, and recent hospitalization for facial palsy thought to be related to giant cell arteritis and was discharged 6/2-6/4 who admitted on 6/5/2023 with abdominal pain found to have pneumoperitoneum on CT.      Perforated gastric ulcer  Patient initially presented with abdominal pain to [facility]. Found to have pneumoperitoneum with suspected gastric perforation taken for emergent exploratory laparotomy and perforated gastric ulcer repair.    - cleared to discharge home per surgery. No issues from medicine standpoint.      Cranial nerve VI facial palsy   Patient presented to the ED 6/2/23 with diplopia and left temple pain secondary to left cranial nerve VI palsy thought to be due to giant cell arteritis.  Patient was evaluated by ophthalmology.  Patient had MRI brain MRA of head and neck in April when symptoms started without acute pathology however there was evidence of intracranial atherosclerosis.  Left temporal biopsy shows no evidence of granulomatous inflammation, chronic inflammation, or giant cells or necrosis. 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     - Patient was treated with IV methylprednisolone 6/3 and transitioned to 80 mg prednisone 6/4.. Given biopsy negative for GCA, will not resume prednisone. Currently no indication to resume steroids  - Plan to closely with opthalmology at discharge     Type II DM, uncontrolled  HgbA1c 6/2/23 8.1.PTA regimen includes 10 mg Lantus at bedtime, metformin 1000 mg twice daily.  - resume home medications on discharge    Hyperlipidemia  Hypertension  - resume home medications on discharge                 Clinically Significant Risk Factors                        # DMII: A1C = 8.1 % (Ref range: <5.7 %) within past 6 months, PRESENT  "ON ADMISSION  # Overweight: Estimated body mass index is 25.29 kg/m  as calculated from the following:    Height as of 6/2/23: 1.651 m (5' 5\").    Weight as of 6/2/23: 68.9 kg (152 lb)., PRESENT ON ADMISSION          Disposition Plan     Expected Discharge Date: 06/10/2023      Destination: home;home with family  Discharge Comments: amber June,   Hospitalist Service  [facility]  Securely message with [employer] (more info)  Text page via [facility] Paging/Directory   ______________________________________________________________________    Interval History   Patient sleeping today and appears comfortable. Plan to discharge home. Can resume home medications. Will stop steroids. Orders signed in discharge navigator    Physical Exam   Vital Signs: Temp: 98.8  F (37.1  C) Temp src: Oral BP: (!) 148/83 Pulse: 84   Resp: 18 SpO2: 98 % O2 Device: None (Room air)    Weight: 0 lbs 0 oz    Constitutional: Sleeping comfortable, respirations even and unlabored  Skin/Integumen: No rashes, no cyanosis, no edema  Other:     Medical Decision Making       30 MINUTES SPENT BY ME on the date of service doing chart review, history, exam, documentation & further activities per the note.      Data         Imaging results reviewed over the past 24 hrs:   No results found for this or any previous visit (from the past 24 hour(s)).  "

## 2023-06-09 NOTE — PLAN OF CARE
POD 4 from a Lap abdominal exploration w/ perforated gastric ulcer repair. Pt is A&Ox4, Swazi speaker. VSS on RA. Denied pain & N/V this shift. PIV infusing 0.9% + KCL meq/l @ 100 ml/hr. Abdominal incisions are C/D/I. Up SBA, voiding spontaneously, active bowel sounds, no BM, passing gas per pt. Tolerating diet. Possible discharge today.

## 2023-06-12 ENCOUNTER — PATIENT OUTREACH (OUTPATIENT)
Dept: CARE COORDINATION | Facility: CLINIC | Age: 68
End: 2023-06-12

## 2023-06-12 ENCOUNTER — TELEPHONE (OUTPATIENT)
Dept: OPHTHALMOLOGY | Facility: CLINIC | Age: 68
End: 2023-06-12

## 2023-06-12 ENCOUNTER — APPOINTMENT (OUTPATIENT)
Dept: INTERPRETER SERVICES | Facility: CLINIC | Age: 68
End: 2023-06-12

## 2023-06-12 NOTE — PROGRESS NOTES
"Clinic Care Coordination Contact  [facility]: Post-Discharge Note  SITUATION                                                      Admission:    Admission Date: 06/05/23   Reason for Admission: Perforated gastric ulcer  Discharge:   Discharge Date: 06/09/23  Discharge Diagnosis: Perforated gastric ulcer, status post LAPAROSCOPIC ABDOMINAL EXPLORATION, REPAIR OF PERFORATED GASTRIC ULCER, WITH FALCIFORM LIGAMENT PATCH. Diabetes, HTN    BACKGROUND                                                      Per hospital discharge summary and inpatient provider notes:    Zeyad Webb is a 67 year old male who was recently hospitalized for giant cell arteritis (discharged 6/4), presented to [facility] 6/5 for acute abdominal pain.  His CT showed pneumoperitoneum suspicious for perforated gastric ulcer.  He was transferred here from [facility] due to OR capacity. After discussing the risks, benefits, and possible complications, an informed consent was obtained and the patient underwent the above procedure. Please see the Operative Report for full details.    ASSESSMENT      Discharge Assessment  How are you doing now that you are home?: \"I'm doing good\"  How are your symptoms? (Red Flag symptoms escalate to triage hotline per guidelines): Improved  Do you feel your condition is stable enough to be safe at home until your provider visit?: Yes  Does the patient have their discharge instructions? : Yes  Does the patient have questions regarding their discharge instructions? : Yes (see comment) (CHW reviewed the follow up appointments in detail with patient since they were scheduled for him before discharge. Explained where the information is on his AVS. Reviewed the date, time, and location of the 3 appointments scheduled since they are at different offices)  Were you started on any new medications or were there changes to any of your previous medications? : Yes  Does the patient have all of their medications?: Yes  Do you have questions " regarding any of your medications? : No  Do you have all of your needed medical supplies or equipment (DME)?  (i.e. oxygen tank, CPAP, cane, etc.): Yes  Discharge follow-up appointment scheduled within 14 calendar days? : Yes  Discharge Follow Up Appointment Date: 06/19/23  Discharge Follow Up Appointment Scheduled with?: Primary Care Provider    Post-op (CHW CTA Only)  If the patient had a surgery or procedure, do they have any questions for a nurse?: No      PLAN                                                      Outpatient Plan:      Follow up with Dr. Mercedes June 21st at 11:45am.   We are located at [address] [place] [address].   Call [phone] if you have any concerns.      Follow up GI in 8-10 weeks for EGD and H pylori testing. --The clinic will call you to schedule this.   If you do not hear from them by June 15th please call them at [phone]     Follow up with ophthalmology.-- Please see Dr. Zhang at [facility] on Thursday, July 13th at 12:30 pm.   The clinic address [address] [place] [address] in [place], [place]     Follow up with your primary care provider, Manisha Weaver NP on Monday, June 19th at 10:10 AM.  Please check in at 9:55 AM.   This is for hospital follow up and your medical issues like diabetes.     [facility] is at the [facility] on [address] [place] [address]. [place] [address].       Future Appointments   Date Time Provider Department Center   6/21/2023  1:45 PM Kevin Mercedes MD [facility]         For any urgent concerns, please contact our 24 hour nurse triage line: [phone] ([phone])       Rose Ambriz  Community Health Worker  [facility]  Ph: [phone]

## 2023-06-12 NOTE — TELEPHONE ENCOUNTER
Gaudencio and ANANYA Jeffers can make an appointment with Dr. Chaves or Dr. Roberts for next available     Silvia Troy Communication Facilitator on 6/12/2023 at 9:09 AM

## 2023-06-20 ENCOUNTER — TELEPHONE (OUTPATIENT)
Dept: OPHTHALMOLOGY | Facility: CLINIC | Age: 68
End: 2023-06-20

## 2023-06-20 NOTE — TELEPHONE ENCOUNTER
Called and spoke to Zeyad with [facility]     Made him an appointment with Dr. Chaves for 7/27 @845 am      - can you please mail a new pt packet     Silvia Troy Communication Facilitator on 6/20/2023 at 12:28 PM

## 2023-07-27 DIAGNOSIS — H53.10 SUBJECTIVE VISUAL DISTURBANCE: Primary | ICD-10-CM

## 2023-08-01 ENCOUNTER — TELEPHONE (OUTPATIENT)
Dept: GASTROENTEROLOGY | Facility: CLINIC | Age: 68
End: 2023-08-01

## 2023-08-01 ENCOUNTER — HOSPITAL ENCOUNTER (OUTPATIENT)
Facility: AMBULATORY SURGERY CENTER | Age: 68
End: 2023-08-01
Attending: INTERNAL MEDICINE

## 2023-08-01 NOTE — TELEPHONE ENCOUNTER
"Endoscopy Scheduling Screen    Have you had a positive Covid test in the last 14 days?  No    Are you active on MyChart?   No    What insurance is in the chart?  Other:  NONE    Ordering/Referring Provider:     JUDSON CRAIN      (If ordering provider performs procedure, schedule with ordering provider unless otherwise instructed. )    BMI: Estimated body mass index is 25.29 kg/m  as calculated from the following:    Height as of 6/2/23: 1.651 m (5' 5\").    Weight as of 6/2/23: 68.9 kg (152 lb).     Sedation Ordered  moderate sedation.   If patient BMI > 50 do not schedule in ASC.    Are you taking any prescription medications for pain?   No    Are you taking methadone or Suboxone?  No    Do you have a history of malignant hyperthermia or adverse reaction to anesthesia?  No    (Females) Are you currently pregnant?        Have you been diagnosed or told you have pulmonary hypertension?   No    Do you have an LVAD?  No    Have you been told you have moderate to severe sleep apnea?  No    Have you been told you have COPD, asthma, or any other lung disease?  No    Do you have any heart conditions?  No     Have you ever had or are you awaiting a heart or lung transplant?   No    Have you had a stroke or transient ischemic attack (TIA aka \"mini stroke\" in the last 6 months?   No    Have you been diagnosed with or been told you have cirrhosis of the liver?   No    Are you currently on dialysis?   No    Do you need assistance transferring?   No    BMI: Estimated body mass index is 25.29 kg/m  as calculated from the following:    Height as of 6/2/23: 1.651 m (5' 5\").    Weight as of 6/2/23: 68.9 kg (152 lb).     Is patients BMI > 40 and scheduling location [facility]?  No    Do you take the medication Phentermine, Ozempic or Wegovy?  No    Do you take the medication Naltrexone?  No    Do you take blood thinners?  No      Prep   Are you currently on dialysis or do you have chronic kidney disease?  No    Do you have a diagnosis of " diabetes?  Yes (Golytely Prep)    Do you have a diagnosis of cystic fibrosis (CF)?  No    On a regular basis do you go 3 -5 days between bowel movements?      BMI > 40?      Preferred Pharmacy:    [facility] - [place], [place] - [address] [place] [address]  Phone: [phone] Fax: [phone]      Final Scheduling Details   Colonoscopy prep sent?      Procedure scheduled  Upper endoscopy (EGD)    Surgeon:  ANDREW     Date of procedure:  10/27     Schedule PAC:   No    Location  [facility] - ASC    Sedation   Moderate Sedation    Patient Reminders:   You will receive a call from a Nurse to review instructions and health history.  This assessment must be completed prior to your procedure.  Failure to complete the Nurse assessment may result in the procedure being cancelled.      On the day of your procedure, please designate an adult(s) who can drive you home stay with you for the next 24 hours. The medicines used in the exam will make you sleepy. You will not be able to drive.      You cannot take public transportation, ride share services, or non-medical taxi service without a responsible caregiver.  Medical transport services are allowed with the requirement that a responsible caregiver will receive you at your destination.  We require that drivers and caregivers are confirmed prior to your procedure.

## 2023-10-12 ENCOUNTER — TELEPHONE (OUTPATIENT)
Dept: GASTROENTEROLOGY | Facility: CLINIC | Age: 68
End: 2023-10-12

## 2023-10-12 NOTE — LETTER
October 12, 2023      Zeyad Machado  [address] [place] [address]              Dear Zeyad Jeffers,     We apologize that we have been unable to contact you by phone. We are calling in regards to your upcoming Upper endoscopy (EGD) procedure that is scheduled on 10/27/23 to complete pre assessment.    Please contact our pre assessment nursing team at your earliest convenience at [phone] option 4. We are open Monday through Friday, 7:00am to 5:00pm. Note that failure to complete Nurse assessment phone call may result in your procedure being cancelled.     Our schedules fill up quickly and are in high demand. If you know that you need to cancel, please contact us so that we can accommodate scheduling for other patients. Our endoscopy scheduling team can be reached at [phone] option 2.     Thank you,  [facility] Endoscopy Team

## 2023-10-12 NOTE — LETTER
October 12, 2023      Zeyad Machado  [address] [place] [address]              Dear Zeyad,      Upper endoscopy (EGD)     Procedure date: 10/27/23    Anticipated arrival time: 2:30 PM   (Procedure Times are Subject to Change)    Facility location: [facility]; [address] [place] [address], 5th Floor, [place] [address] - Check in location: 5th Floor. Parking information: Self pay parking is available in West surface lot directly across from the  main entrance of the [facility] [place], [place]. Entry and exit to this lot is on [facility]. In  addition, self pay parking is also available in [facility] [address] ([address] [facility]).  We have dedicated patient only spots on 1st floor of [employer] ramp.  services are available for patients with limited mobility. Services available Monday-Friday, 7:00a.m.-  5:00p.m.      Important Procedure Reminders:     Prep Instructions:   Instructions on how to prepare for your upcoming procedure are found below. Please read instructions carefully. Deviation from instructions may result in less than desired outcomes and procedure may need to be rescheduled. If you have additional questions regarding how to prepare for your upcoming procedure, please contact our endoscopy pre assessment nurses at [phone] option 4.      Policy:   On the day of your procedure, please designatean adult(s) who can drive you home stay with you for the next 24 hours. The medicines used in the exam will make you sleepy. You will not be able to drive. You cannot take public transportation, ride share services, or non-medical taxi service without a responsible caregiver.  Medical transport services are allowed with the requirement that a responsible caregiver will receive you at your destination.  We require that drivers and caregivers are confirmed prior to your procedure.    Day of procedure:  Please do not wear jewelry (i.e. earrings, rings, necklaces, watches, etc) . Leave your purse, billfold, credit  cards, and other valuables at home.   Bring insurance card and ID.     To cancel or reschedule your procedure:   Please call our endoscopy scheduling team at: [facility]: [phone], option 2. Monday through Friday, 7:00am-5:00pm.      Medication Reminders:    Please note the following medication holding recommendations:   Oral diabetic medication(s): Metformin (glucophage): HOLD day of procedure.  Insulin.  Consult with your managing provider.     ----------------------------------------------------------------------------------------------------------------------------------------------------------------------------------------------------------------------------------------------------------------------    Instructions for Your Upper Endoscopy      You will receive a call from a Nurse to review instructions and health history.  This assessment must be completed prior to your procedure.  Failure to complete the Nurse assessment phone call may result in the procedure being cancelled.    On the day of your procedure, please designatean adult(s) who can drive you home stay with you for the next 24 hours. The medicines used in the exam will make you sleepy. You will not be able to drive.    You cannot take public transportation, ride share services, or non-medical taxi service without a responsible caregiver.  Medical transport services are allowed with the requirement that a responsible caregiver will receive you at your destination.  We require that drivers and caregivers are confirmed prior to your procedure.        What is an upper endoscopy?  - This is an exam that checks for problems linked to heartburn, swallowing or belly (abdominal) pain or other symptoms of the upper GI tract. Depending on your symptoms, the doctor will look at your esophagus (food pipe), stomach and the part of the stomach that enters the small intestine.     Getting ready  - Dress in comfortable, loose clothing.  -  Bring your insurance card. Leave your purse, billfold, credit cards and other valuables at home.  - Bring a list of your medicines and known allergies. If you have a pacemaker or ICD, please bring your information card.  - We do our best to stay on time, but there may be a delay. Please bring something to pass the time, such as a newspaper or book.    - Important: You must complete all steps before the exam.     Seven days before the exam   - Talk to your doctor: If you take blood-thinners (such as Coumadin, Plavix, Xarelto), your prescription or schedule may need to change before the test.  - Talk to your prescribing provider: If you take prescription NSAIDS (such as Sulindac, Celebrex, Mobic, Relafen). Your prescribing provider will tell you what to do.   - Continue taking prescribed aspirin; talk to your prescribing doctor with any concerns.    - If you have diabetes: Ask to have your exam early in the morning. Also, ask your doctor if you should change your diet or medicines    One day before the exam   - Stop eating all solid foods 8 hours prior to arrival time. You may drink clear liquids.   **If you have achalasia (treated or untreated) or gastroparesis, please be on a clear liquid diet for 24 hours prior to your exam and stop drinking all liquids at midnight.   - Stop taking sucralfate medication.  - Stop taking NSAID pain relievers, such as Advil, Ibuprofen, Motrin, etc.  You may take Tylenol.    Day of the exam  - You may drink clear liquids until 2 hours before your arrival time.  - You may take all of your morning medicines (except for diabetes pills) as usual with 4 oz. of water up to 2 hours before your arrival time.  - If you take diabetes medicine (pills): do not take them the morning of your test.  - Bring a list of your medicines and known allergies.  - Please do not wear jewelry (i.e. earrings, rings, necklaces, watches, etc) . Leave your purse, billfold, credit cards, and other valuables at home.    - Please arrive with an adult who can take you home after the test: The medicine will make you sleepy. If you do not have a , we may cancel your test.     What are clear liquids?   You may have:  - Water, tea, coffee (no cream)  - Soda pop, Gatorade   - Clear nutrition drinks (Enlive, Resource Breeze)   - Jell-O, Popsicles (no milk or fruit pieces) or sorbet   - Fat-free soup broth or bouillon  - Plain hard cand, such as clear life savers   - Clear juices and fruit-flavored drinks such as apple juice, white grape juice, Hi-C and Mina-Aid    Do not have:  - Milk or milk products such as ice cream, malts or shakes  - Juices with pulp such as orange, grapefruit, pineapple or tomato juice  - Cream soups of any kind  - Alcohol       During the exam  - The exam lasts from 10 to 20 minutes.   - We will review the risks and benefits of the exam. We will then ask you to sign a consent form.  - We will place a small needle (IV) in your hand or arm. We will give you medicine through the IV to keep you comfortable.   - The endoscope will be advanced through your mouth and the exam completed.  - When we put air into your stomach, you may feel full or have mild cramps. We will remove the air at the end of the exam.  - To reduce discomfort, breathe at a slow, even pace. Try to relax the muscles in your neck and shoulders.  - We may take a small piece of tissue (a biopsy) to test in the lab. It will not hurt. We may also take pictures of your insides.     After the exam  - You will rest in the recovery area until you feel ready to leave. This takes about 30 to 60 minutes.   - We will remove the IV.  - You may burp up air left in your stomach.  - You may feel drowsy or a little dizzy from the medicine.   - Your doctor will discuss your results. You will receive your test results in 7 to 10 business days by letter or MyChart.   - Your throat may feel numb. One hour after the exam, swallow a small amount of cool water. If you  can swallow easily, you may go back to your regular diet and medicines.  - Your throat may be sore for the rest of the day. Throat lozenges or ice chips may help.  - Do not drive for 24 hours.    Call us at once if you have:  - Unusual pain or problems swallowing, unusual stomach or chest pain.  - Vomit that looks like coffee grounds or black or bloody stools (bowel movements).  - A fever above 100.6  F (37.5  C) when taken under the tongue.    Test results  - You will receive your results in 7 to 10 business days by phone, letter or MyChart.    For questions or appointments, call:  [facility]   [phone], option 2  Monday through Friday, 7 a.m. to 5 p.m.  (If it is after hours please reach out to the clinic or provider you were scheduled with.)    You should be aware that your endoscopist may be a part of a study to improve endoscopy procedures.  As part of a study, pictures gathered from your procedure may be stored and analyzed in a de-identified manner.

## 2023-10-12 NOTE — TELEPHONE ENCOUNTER
Attempted to contact patient in order to complete pre assessment questions.     No answer. Left message to return call to [phone] option 4    Via  ID [id number]    Procedure details:    Patient scheduled for Upper endoscopy (EGD) on 10/27/23.     Arrival time: 1430. Procedure time 1530    Pre op exam needed? N/A    [facility]; [address] [place] [address], 5th Floor, [place] [address]    Sedation type: MAC    Indication for procedure:   Acute gastric ulcer with perforation            Chart review:     Electronic implanted devices? No    Diabetic? Yes. See medication holding recommendations     Diabetic medication HOLDING recommendations: (if applicable)  Oral diabetic medications: Yes:  Metformin (glucophage): HOLD day of procedure.  Diabetic injectables: No  Insulin: Yes. Consult with managing provider       Medication review:    Anticoagulants? No    NSAIDS? No    Other medication HOLDING recommendations:  N/A      Prep for procedure:     Prep instructions sent via letter.     Shea Teran RN  Endoscopy Procedure Pre Assessment RN

## 2023-10-19 NOTE — TELEPHONE ENCOUNTER
Second call attempt to complete pre assessment.     Via  ID [id number]    No answer.  Voicemail was not left - every time the  tried to leave a message the voicemail would cut her off and start over. Tried twice and happened both times.    Additional information needed?  N/A      Shea Teran, RN  Endoscopy Procedure Pre Assessment RN

## 2023-10-23 ENCOUNTER — TELEPHONE (OUTPATIENT)
Dept: GASTROENTEROLOGY | Facility: CLINIC | Age: 68
End: 2023-10-23

## 2023-10-23 NOTE — TELEPHONE ENCOUNTER
No return call received.   Pre assessment was not completed for upcoming scheduled procedure.     Staff message sent to endoscopy scheduling to cancel procedure per policy.       Shea Teran RN   Endoscopy Procedure Pre Assessment RN

## 2023-10-23 NOTE — TELEPHONE ENCOUNTER
Caller: No call made  Reason for Reschedule/Cancellation (please be detailed, any staff messages or encounters to note?): Cancellation policy - pre-assessment call not completed.      Prior to reschedule please review:  Ordering Provider: Kyung Minaya PA-C   Sedation per order: Moderate  Does patient have any ASC Exclusions, please identify?: No      Notes on Cancelled Procedure:  Procedure: Upper Endoscopy [EGD]   Date: 10/27/2023  Location: [facility]; [address] [place] [address], 5th Floor, [place] [id number]  Surgeon: DIANA Fink      Rescheduled: No, sent letter and CTL.

## (undated) DEVICE — SU VICRYL 2-0 SH 27" J317H

## (undated) DEVICE — SU VICRYL 3-0 SH 27" UND J416H

## (undated) DEVICE — ESU HOLDER LAP INST DISP PURPLE LONG 330MM H-PRO-330

## (undated) DEVICE — GLOVE BIOGEL PI MICRO SZ 7.5 48575

## (undated) DEVICE — DRAIN JACKSON PRATT RESERVOIR 100ML SU130-1305

## (undated) DEVICE — ESU ELEC BLADE 6" COATED E1450-6

## (undated) DEVICE — RX VISTASEAL FIBRIN SEALANT W/THROMBIN 4ML VST04

## (undated) DEVICE — SPONGE BALL KERLIX ROUND XL W/O STRING LATEX 4935

## (undated) DEVICE — COVER CLAMP FABRIC RADIOPAQUE 9.5X150MM 072002PBX

## (undated) DEVICE — DRSG GAUZE 4X4" 3033

## (undated) DEVICE — SOL WATER IRRIG 1000ML BOTTLE 2F7114

## (undated) DEVICE — SU VICRYL 0 TIE 12X18" J906G

## (undated) DEVICE — LINEN TOWEL PACK X5 5464

## (undated) DEVICE — DRAIN JACKSON PRATT 15FR ROUND SU130-1323

## (undated) DEVICE — SU MONOCRYL 4-0 PS-2 18" UND Y496G

## (undated) DEVICE — BLADE KNIFE SURG 15 371115

## (undated) DEVICE — GLOVE BIOGEL PI MICRO INDICATOR UNDERGLOVE SZ 7.5 48975

## (undated) DEVICE — SU PROLENE 4-0 PC-3 18" 8634G

## (undated) DEVICE — SU VICRYL 0 UR-6 27" J603H

## (undated) DEVICE — SUCTION IRRIGATION STRYKFLOW II W/TIP DISP 250-070-520

## (undated) DEVICE — SU VICRYL 3-0 SH CR 8X18" J774

## (undated) DEVICE — BLADE CLIPPER 4406

## (undated) DEVICE — ESU GROUND PAD UNIVERSAL W/O CORD

## (undated) DEVICE — APPLICATOR VISTASEAL RIGID TIP 35CM VSTL35

## (undated) DEVICE — DRAIN CHANNEL ROUND 15FR FLUTED 072188

## (undated) DEVICE — PACK MAJOR SBA15MAFSI

## (undated) DEVICE — ENDO SCOPE WARMER LF TM500

## (undated) DEVICE — PACK LAP CHOLE SLC15LCFSD

## (undated) DEVICE — DECANTER VIAL 2006S

## (undated) DEVICE — SU ETHILON 3-0 FS-1 18" 669H

## (undated) DEVICE — PREP CHLORAPREP 26ML TINTED HI-LITE ORANGE 930815

## (undated) DEVICE — SU VICRYL 2-0 TIE 12X18" J905T

## (undated) DEVICE — SOL NACL 0.9% INJ 1000ML BAG 2B1324X

## (undated) DEVICE — Device

## (undated) DEVICE — DRAPE LAP W/ARMBOARD 29410

## (undated) RX ORDER — FENTANYL CITRATE 50 UG/ML
INJECTION, SOLUTION INTRAMUSCULAR; INTRAVENOUS
Status: DISPENSED
Start: 2023-06-05

## (undated) RX ORDER — HYDROMORPHONE HYDROCHLORIDE 1 MG/ML
INJECTION, SOLUTION INTRAMUSCULAR; INTRAVENOUS; SUBCUTANEOUS
Status: DISPENSED
Start: 2023-06-05